# Patient Record
Sex: FEMALE | Race: WHITE | NOT HISPANIC OR LATINO
[De-identification: names, ages, dates, MRNs, and addresses within clinical notes are randomized per-mention and may not be internally consistent; named-entity substitution may affect disease eponyms.]

---

## 2017-01-19 ENCOUNTER — APPOINTMENT (OUTPATIENT)
Dept: ENDOCRINOLOGY | Facility: CLINIC | Age: 82
End: 2017-01-19

## 2017-01-19 VITALS
HEART RATE: 59 BPM | SYSTOLIC BLOOD PRESSURE: 120 MMHG | HEIGHT: 62 IN | WEIGHT: 166 LBS | BODY MASS INDEX: 30.55 KG/M2 | DIASTOLIC BLOOD PRESSURE: 80 MMHG | RESPIRATION RATE: 98 BRPM

## 2017-01-19 DIAGNOSIS — Z63.5 DISRUPTION OF FAMILY BY SEPARATION AND DIVORCE: ICD-10-CM

## 2017-01-19 DIAGNOSIS — Z78.9 OTHER SPECIFIED HEALTH STATUS: ICD-10-CM

## 2017-01-19 SDOH — SOCIAL STABILITY - SOCIAL INSECURITY: DISRUPTION OF FAMILY BY SEPARATION AND DIVORCE: Z63.5

## 2017-01-20 ENCOUNTER — MEDICATION RENEWAL (OUTPATIENT)
Age: 82
End: 2017-01-20

## 2017-01-20 LAB
T4 FREE SERPL-MCNC: 1 NG/DL
TSH SERPL-ACNC: 17.06 UIU/ML

## 2017-01-23 LAB
THYROGLOB AB SERPL-ACNC: 849 IU/ML
THYROPEROXIDASE AB SERPL IA-ACNC: 953 IU/ML

## 2017-03-02 ENCOUNTER — APPOINTMENT (OUTPATIENT)
Dept: ENDOCRINOLOGY | Facility: CLINIC | Age: 82
End: 2017-03-02

## 2017-03-02 VITALS — BODY MASS INDEX: 30 KG/M2 | WEIGHT: 164 LBS

## 2017-03-20 ENCOUNTER — MEDICATION RENEWAL (OUTPATIENT)
Age: 82
End: 2017-03-20

## 2017-03-20 LAB
T4 FREE SERPL-MCNC: 2 NG/DL
TSH SERPL-ACNC: 0.1 UIU/ML

## 2017-04-17 ENCOUNTER — CLINICAL ADVICE (OUTPATIENT)
Age: 82
End: 2017-04-17

## 2017-05-08 ENCOUNTER — RESULT REVIEW (OUTPATIENT)
Age: 82
End: 2017-05-08

## 2017-05-12 ENCOUNTER — APPOINTMENT (OUTPATIENT)
Dept: ENDOCRINOLOGY | Facility: CLINIC | Age: 82
End: 2017-05-12

## 2017-05-12 VITALS
HEIGHT: 62 IN | OXYGEN SATURATION: 98 % | WEIGHT: 164 LBS | SYSTOLIC BLOOD PRESSURE: 120 MMHG | BODY MASS INDEX: 30.18 KG/M2 | HEART RATE: 63 BPM | DIASTOLIC BLOOD PRESSURE: 64 MMHG

## 2017-05-12 RX ORDER — AMMONIUM LACTATE 12 %
12 CREAM (GRAM) TOPICAL
Qty: 385 | Refills: 0 | Status: DISCONTINUED | COMMUNITY
Start: 2016-05-12 | End: 2017-05-12

## 2017-05-16 ENCOUNTER — MEDICATION RENEWAL (OUTPATIENT)
Age: 82
End: 2017-05-16

## 2017-05-16 LAB
25(OH)D3 SERPL-MCNC: 43.4 NG/ML
HBA1C MFR BLD HPLC: 5.4 %
T4 FREE SERPL-MCNC: 1.9 NG/DL
TSH SERPL-ACNC: 0.12 UIU/ML

## 2017-06-14 ENCOUNTER — RX RENEWAL (OUTPATIENT)
Age: 82
End: 2017-06-14

## 2017-06-20 ENCOUNTER — APPOINTMENT (OUTPATIENT)
Dept: ENDOCRINOLOGY | Facility: CLINIC | Age: 82
End: 2017-06-20

## 2017-06-20 VITALS — BODY MASS INDEX: 29.41 KG/M2 | WEIGHT: 160.8 LBS

## 2017-07-06 ENCOUNTER — OUTPATIENT (OUTPATIENT)
Dept: OUTPATIENT SERVICES | Facility: HOSPITAL | Age: 82
LOS: 1 days | End: 2017-07-06
Payer: MEDICARE

## 2017-07-06 DIAGNOSIS — Z01.818 ENCOUNTER FOR OTHER PREPROCEDURAL EXAMINATION: ICD-10-CM

## 2017-07-06 DIAGNOSIS — N83.209 UNSPECIFIED OVARIAN CYST, UNSPECIFIED SIDE: ICD-10-CM

## 2017-07-06 LAB
ANION GAP SERPL CALC-SCNC: 13 MMOL/L — SIGNIFICANT CHANGE UP (ref 5–17)
BUN SERPL-MCNC: 23 MG/DL — SIGNIFICANT CHANGE UP (ref 7–23)
CALCIUM SERPL-MCNC: 9.5 MG/DL — SIGNIFICANT CHANGE UP (ref 8.4–10.5)
CHLORIDE SERPL-SCNC: 104 MMOL/L — SIGNIFICANT CHANGE UP (ref 96–108)
CO2 SERPL-SCNC: 26 MMOL/L — SIGNIFICANT CHANGE UP (ref 22–31)
CREAT SERPL-MCNC: 0.68 MG/DL — SIGNIFICANT CHANGE UP (ref 0.5–1.3)
GLUCOSE SERPL-MCNC: 126 MG/DL — HIGH (ref 70–99)
HCT VFR BLD CALC: 41.9 % — SIGNIFICANT CHANGE UP (ref 34.5–45)
HGB BLD-MCNC: 14 G/DL — SIGNIFICANT CHANGE UP (ref 11.5–15.5)
MCHC RBC-ENTMCNC: 31.5 PG — SIGNIFICANT CHANGE UP (ref 27–34)
MCHC RBC-ENTMCNC: 33.3 GM/DL — SIGNIFICANT CHANGE UP (ref 32–36)
MCV RBC AUTO: 94.5 FL — SIGNIFICANT CHANGE UP (ref 80–100)
PLATELET # BLD AUTO: 256 K/UL — SIGNIFICANT CHANGE UP (ref 150–400)
POTASSIUM SERPL-MCNC: 4.1 MMOL/L — SIGNIFICANT CHANGE UP (ref 3.5–5.3)
POTASSIUM SERPL-SCNC: 4.1 MMOL/L — SIGNIFICANT CHANGE UP (ref 3.5–5.3)
RBC # BLD: 4.43 M/UL — SIGNIFICANT CHANGE UP (ref 3.8–5.2)
RBC # FLD: 12.5 % — SIGNIFICANT CHANGE UP (ref 10.3–14.5)
SODIUM SERPL-SCNC: 143 MMOL/L — SIGNIFICANT CHANGE UP (ref 135–145)
WBC # BLD: 5.7 K/UL — SIGNIFICANT CHANGE UP (ref 3.8–10.5)
WBC # FLD AUTO: 5.7 K/UL — SIGNIFICANT CHANGE UP (ref 3.8–10.5)

## 2017-07-06 PROCEDURE — 85027 COMPLETE CBC AUTOMATED: CPT

## 2017-07-06 PROCEDURE — G0463: CPT

## 2017-07-06 PROCEDURE — 80048 BASIC METABOLIC PNL TOTAL CA: CPT

## 2017-07-12 LAB
T4 FREE SERPL-MCNC: 1.6 NG/DL
TSH SERPL-ACNC: 0.76 UIU/ML

## 2017-07-18 RX ORDER — CELECOXIB 200 MG/1
200 CAPSULE ORAL ONCE
Qty: 0 | Refills: 0 | Status: COMPLETED | OUTPATIENT
Start: 2017-07-20 | End: 2017-07-20

## 2017-07-18 RX ORDER — CEFAZOLIN SODIUM 1 G
2000 VIAL (EA) INJECTION ONCE
Qty: 0 | Refills: 0 | Status: DISCONTINUED | OUTPATIENT
Start: 2017-07-20 | End: 2017-08-04

## 2017-07-18 RX ORDER — ACETAMINOPHEN 500 MG
975 TABLET ORAL ONCE
Qty: 0 | Refills: 0 | Status: COMPLETED | OUTPATIENT
Start: 2017-07-20 | End: 2017-07-20

## 2017-07-18 RX ORDER — TRAMADOL HYDROCHLORIDE 50 MG/1
50 TABLET ORAL ONCE
Qty: 0 | Refills: 0 | Status: DISCONTINUED | OUTPATIENT
Start: 2017-07-20 | End: 2017-07-20

## 2017-07-20 ENCOUNTER — TRANSCRIPTION ENCOUNTER (OUTPATIENT)
Age: 82
End: 2017-07-20

## 2017-07-20 ENCOUNTER — RESULT REVIEW (OUTPATIENT)
Age: 82
End: 2017-07-20

## 2017-07-20 ENCOUNTER — OUTPATIENT (OUTPATIENT)
Dept: OUTPATIENT SERVICES | Facility: HOSPITAL | Age: 82
LOS: 1 days | End: 2017-07-20
Payer: MEDICARE

## 2017-07-20 VITALS
HEART RATE: 37 BPM | OXYGEN SATURATION: 96 % | HEIGHT: 62 IN | DIASTOLIC BLOOD PRESSURE: 70 MMHG | SYSTOLIC BLOOD PRESSURE: 112 MMHG | TEMPERATURE: 99 F | RESPIRATION RATE: 18 BRPM

## 2017-07-20 VITALS
HEART RATE: 62 BPM | DIASTOLIC BLOOD PRESSURE: 67 MMHG | RESPIRATION RATE: 14 BRPM | OXYGEN SATURATION: 97 % | SYSTOLIC BLOOD PRESSURE: 125 MMHG

## 2017-07-20 DIAGNOSIS — N83.209 UNSPECIFIED OVARIAN CYST, UNSPECIFIED SIDE: ICD-10-CM

## 2017-07-20 LAB
BLD GP AB SCN SERPL QL: NEGATIVE — SIGNIFICANT CHANGE UP
RH IG SCN BLD-IMP: NEGATIVE — SIGNIFICANT CHANGE UP

## 2017-07-20 PROCEDURE — 88341 IMHCHEM/IMCYTCHM EA ADD ANTB: CPT

## 2017-07-20 PROCEDURE — 88331 PATH CONSLTJ SURG 1 BLK 1SPC: CPT | Mod: 26

## 2017-07-20 PROCEDURE — 88331 PATH CONSLTJ SURG 1 BLK 1SPC: CPT

## 2017-07-20 PROCEDURE — 86850 RBC ANTIBODY SCREEN: CPT

## 2017-07-20 PROCEDURE — 88307 TISSUE EXAM BY PATHOLOGIST: CPT | Mod: 26

## 2017-07-20 PROCEDURE — 88307 TISSUE EXAM BY PATHOLOGIST: CPT

## 2017-07-20 PROCEDURE — 88305 TISSUE EXAM BY PATHOLOGIST: CPT

## 2017-07-20 PROCEDURE — 88112 CYTOPATH CELL ENHANCE TECH: CPT

## 2017-07-20 PROCEDURE — 88342 IMHCHEM/IMCYTCHM 1ST ANTB: CPT

## 2017-07-20 PROCEDURE — 58558 HYSTEROSCOPY BIOPSY: CPT

## 2017-07-20 PROCEDURE — 88342 IMHCHEM/IMCYTCHM 1ST ANTB: CPT | Mod: 26

## 2017-07-20 PROCEDURE — 86900 BLOOD TYPING SEROLOGIC ABO: CPT

## 2017-07-20 PROCEDURE — 88304 TISSUE EXAM BY PATHOLOGIST: CPT

## 2017-07-20 PROCEDURE — 86901 BLOOD TYPING SEROLOGIC RH(D): CPT

## 2017-07-20 PROCEDURE — 88341 IMHCHEM/IMCYTCHM EA ADD ANTB: CPT | Mod: 26

## 2017-07-20 PROCEDURE — 58661 LAPAROSCOPY REMOVE ADNEXA: CPT

## 2017-07-20 PROCEDURE — 88304 TISSUE EXAM BY PATHOLOGIST: CPT | Mod: 26

## 2017-07-20 PROCEDURE — 88305 TISSUE EXAM BY PATHOLOGIST: CPT | Mod: 26

## 2017-07-20 PROCEDURE — 88112 CYTOPATH CELL ENHANCE TECH: CPT | Mod: 26

## 2017-07-20 RX ORDER — OXYCODONE HYDROCHLORIDE 5 MG/1
1 TABLET ORAL
Qty: 10 | Refills: 0
Start: 2017-07-20

## 2017-07-20 RX ORDER — SODIUM CHLORIDE 9 MG/ML
3 INJECTION INTRAMUSCULAR; INTRAVENOUS; SUBCUTANEOUS EVERY 8 HOURS
Qty: 0 | Refills: 0 | Status: DISCONTINUED | OUTPATIENT
Start: 2017-07-20 | End: 2017-07-20

## 2017-07-20 RX ORDER — HEPARIN SODIUM 5000 [USP'U]/ML
5000 INJECTION INTRAVENOUS; SUBCUTANEOUS ONCE
Qty: 0 | Refills: 0 | Status: COMPLETED | OUTPATIENT
Start: 2017-07-20 | End: 2017-07-20

## 2017-07-20 RX ORDER — HYDROMORPHONE HYDROCHLORIDE 2 MG/ML
0.5 INJECTION INTRAMUSCULAR; INTRAVENOUS; SUBCUTANEOUS
Qty: 0 | Refills: 0 | Status: DISCONTINUED | OUTPATIENT
Start: 2017-07-20 | End: 2017-07-20

## 2017-07-20 RX ORDER — ONDANSETRON 8 MG/1
4 TABLET, FILM COATED ORAL ONCE
Qty: 0 | Refills: 0 | Status: DISCONTINUED | OUTPATIENT
Start: 2017-07-20 | End: 2017-07-20

## 2017-07-20 RX ORDER — SODIUM CHLORIDE 9 MG/ML
1000 INJECTION, SOLUTION INTRAVENOUS
Qty: 0 | Refills: 0 | Status: DISCONTINUED | OUTPATIENT
Start: 2017-07-20 | End: 2017-08-04

## 2017-07-20 RX ADMIN — SODIUM CHLORIDE 125 MILLILITER(S): 9 INJECTION, SOLUTION INTRAVENOUS at 16:00

## 2017-07-20 RX ADMIN — CELECOXIB 200 MILLIGRAM(S): 200 CAPSULE ORAL at 11:16

## 2017-07-20 RX ADMIN — HEPARIN SODIUM 5000 UNIT(S): 5000 INJECTION INTRAVENOUS; SUBCUTANEOUS at 11:20

## 2017-07-20 RX ADMIN — SODIUM CHLORIDE 3 MILLILITER(S): 9 INJECTION INTRAMUSCULAR; INTRAVENOUS; SUBCUTANEOUS at 11:18

## 2017-07-20 RX ADMIN — Medication 975 MILLIGRAM(S): at 11:16

## 2017-07-20 RX ADMIN — TRAMADOL HYDROCHLORIDE 50 MILLIGRAM(S): 50 TABLET ORAL at 12:37

## 2017-07-20 NOTE — BRIEF OPERATIVE NOTE - OPERATION/FINDINGS
On hysteroscopy endometrium atrophic with subcentimeter R walled uterine polyp. Grossly normal upper abdomen, minimal fine adhesion omentum to ant abdominal wall. Small mobile uterus. Grossly normal left tube and ovary. Right ovary with 3-4cm simple ovarian cyst

## 2017-07-20 NOTE — BRIEF OPERATIVE NOTE - PRE-OP DX
Adnexal mass  07/20/2017  Right  Active  Sample, Kerrie  Endometrial polyp  07/20/2017    Active  Sample, Kerrie

## 2017-07-20 NOTE — BRIEF OPERATIVE NOTE - POST-OP DX
Cyst of right ovary  07/20/2017    Active  Sample, Kerrie  Endometrial polyp  07/20/2017    Active  Sample, Kerrie

## 2017-07-20 NOTE — ASU DISCHARGE PLAN (ADULT/PEDIATRIC). - MEDICATION SUMMARY - MEDICATIONS TO TAKE
I will START or STAY ON the medications listed below when I get home from the hospital:    Tylenol 325 mg oral tablet  -- 3 tab(s) by mouth every 6 hours  -- Indication: For pain    Motrin 600 mg oral tablet  -- 1 tab(s) by mouth every 6 hours  -- Indication: For pain    oxyCODONE 5 mg oral tablet  -- 1 tab(s) by mouth every 6 hours, As Needed MDD:4  -- Caution federal law prohibits the transfer of this drug to any person other  than the person for whom it was prescribed.  It is very important that you take or use this exactly as directed.  Do not skip doses or discontinue unless directed by your doctor.  May cause drowsiness.  Alcohol may intensify this effect.  Use care when operating dangerous machinery.  This prescription cannot be refilled.  Using more of this medication than prescribed may cause serious breathing problems.    -- Indication: For pain

## 2017-07-20 NOTE — ASU DISCHARGE PLAN (ADULT/PEDIATRIC). - ACTIVITY LEVEL
no douching/for two weeks/no intercourse/no heavy lifting/no tampons/no tub baths/nothing per vagina

## 2017-07-20 NOTE — BRIEF OPERATIVE NOTE - SPECIMENS
EMC, endometrial polyp, Left fallopian tube, Right fallopian tube and ovary, Cyst Fluid, peritoneal wshings

## 2017-07-20 NOTE — BRIEF OPERATIVE NOTE - PROCEDURE
Laparoscopic salpingo-oophorectomy  07/20/2017  Right  Active  NSAMPLE  Laparoscopic salpingectomy  07/20/2017  Left  Active  NSAMPLE  Hysteroscopy with polypectomy of uterus  07/20/2017    Active  NSAMPLE

## 2017-07-24 LAB
NON-GYNECOLOGICAL CYTOLOGY STUDY: SIGNIFICANT CHANGE UP
NON-GYNECOLOGICAL CYTOLOGY STUDY: SIGNIFICANT CHANGE UP

## 2017-07-25 LAB — SURGICAL PATHOLOGY STUDY: SIGNIFICANT CHANGE UP

## 2017-10-13 ENCOUNTER — APPOINTMENT (OUTPATIENT)
Dept: ENDOCRINOLOGY | Facility: CLINIC | Age: 82
End: 2017-10-13
Payer: MEDICARE

## 2017-10-13 VITALS — BODY MASS INDEX: 28.94 KG/M2 | WEIGHT: 158.2 LBS

## 2017-10-13 PROCEDURE — 97803 MED NUTRITION INDIV SUBSEQ: CPT

## 2017-11-14 ENCOUNTER — APPOINTMENT (OUTPATIENT)
Dept: ENDOCRINOLOGY | Facility: CLINIC | Age: 82
End: 2017-11-14
Payer: MEDICARE

## 2017-11-14 VITALS
HEART RATE: 67 BPM | OXYGEN SATURATION: 97 % | DIASTOLIC BLOOD PRESSURE: 68 MMHG | HEIGHT: 62 IN | SYSTOLIC BLOOD PRESSURE: 122 MMHG | WEIGHT: 157 LBS | BODY MASS INDEX: 28.89 KG/M2

## 2017-11-14 DIAGNOSIS — R73.09 OTHER ABNORMAL GLUCOSE: ICD-10-CM

## 2017-11-14 PROCEDURE — 99214 OFFICE O/P EST MOD 30 MIN: CPT

## 2017-11-14 RX ORDER — LEVOTHYROXINE SODIUM 112 UG/1
112 TABLET ORAL
Qty: 90 | Refills: 2 | Status: DISCONTINUED | COMMUNITY
Start: 2017-06-14 | End: 2017-11-14

## 2017-11-17 ENCOUNTER — MEDICATION RENEWAL (OUTPATIENT)
Age: 82
End: 2017-11-17

## 2017-11-17 LAB
ALBUMIN SERPL ELPH-MCNC: 4.2 G/DL
ALP BLD-CCNC: 84 U/L
ALT SERPL-CCNC: 15 U/L
ANION GAP SERPL CALC-SCNC: 16 MMOL/L
AST SERPL-CCNC: 20 U/L
BILIRUB SERPL-MCNC: 0.4 MG/DL
BUN SERPL-MCNC: 19 MG/DL
CALCIUM SERPL-MCNC: 10.1 MG/DL
CHLORIDE SERPL-SCNC: 104 MMOL/L
CO2 SERPL-SCNC: 25 MMOL/L
CREAT SERPL-MCNC: 0.81 MG/DL
GLUCOSE SERPL-MCNC: 89 MG/DL
HBA1C MFR BLD HPLC: 5.5 %
POTASSIUM SERPL-SCNC: 4.3 MMOL/L
PROT SERPL-MCNC: 6.8 G/DL
SODIUM SERPL-SCNC: 145 MMOL/L
T4 FREE SERPL-MCNC: 1.7 NG/DL
TSH SERPL-ACNC: 1.59 UIU/ML

## 2018-03-15 ENCOUNTER — APPOINTMENT (OUTPATIENT)
Dept: ORTHOPEDIC SURGERY | Facility: CLINIC | Age: 83
End: 2018-03-15

## 2018-03-16 ENCOUNTER — APPOINTMENT (OUTPATIENT)
Dept: ORTHOPEDIC SURGERY | Facility: CLINIC | Age: 83
End: 2018-03-16
Payer: MEDICARE

## 2018-03-16 VITALS
DIASTOLIC BLOOD PRESSURE: 68 MMHG | WEIGHT: 160 LBS | SYSTOLIC BLOOD PRESSURE: 123 MMHG | HEART RATE: 62 BPM | BODY MASS INDEX: 29.44 KG/M2 | HEIGHT: 62 IN

## 2018-03-16 DIAGNOSIS — M25.512 PAIN IN LEFT SHOULDER: ICD-10-CM

## 2018-03-16 PROCEDURE — 99204 OFFICE O/P NEW MOD 45 MIN: CPT

## 2018-03-16 PROCEDURE — 72040 X-RAY EXAM NECK SPINE 2-3 VW: CPT

## 2018-03-16 PROCEDURE — 73030 X-RAY EXAM OF SHOULDER: CPT | Mod: LT

## 2018-03-16 RX ORDER — LOTEPREDNOL ETABONATE 5 MG/ML
0.5 SUSPENSION/ DROPS OPHTHALMIC
Qty: 5 | Refills: 0 | Status: ACTIVE | COMMUNITY
Start: 2017-08-11

## 2018-03-16 RX ORDER — BRINZOLAMIDE 10 MG/ML
1 SUSPENSION/ DROPS OPHTHALMIC
Qty: 10 | Refills: 0 | Status: ACTIVE | COMMUNITY
Start: 2017-10-31

## 2018-03-16 RX ORDER — CYCLOSPORINE 0.5 MG/ML
0.05 EMULSION OPHTHALMIC
Qty: 180 | Refills: 0 | Status: ACTIVE | COMMUNITY
Start: 2017-08-11

## 2018-03-16 RX ORDER — LEVOTHYROXINE SODIUM 137 UG/1
TABLET ORAL
Refills: 0 | Status: ACTIVE | COMMUNITY

## 2018-04-26 ENCOUNTER — APPOINTMENT (OUTPATIENT)
Dept: ORTHOPEDIC SURGERY | Facility: CLINIC | Age: 83
End: 2018-04-26
Payer: MEDICARE

## 2018-04-26 VITALS
HEART RATE: 48 BPM | BODY MASS INDEX: 29.44 KG/M2 | DIASTOLIC BLOOD PRESSURE: 66 MMHG | SYSTOLIC BLOOD PRESSURE: 146 MMHG | WEIGHT: 160 LBS | HEIGHT: 62 IN

## 2018-04-26 PROCEDURE — 99213 OFFICE O/P EST LOW 20 MIN: CPT

## 2018-05-15 ENCOUNTER — APPOINTMENT (OUTPATIENT)
Dept: ENDOCRINOLOGY | Facility: CLINIC | Age: 83
End: 2018-05-15

## 2019-02-27 ENCOUNTER — RX RENEWAL (OUTPATIENT)
Age: 84
End: 2019-02-27

## 2019-04-30 ENCOUNTER — APPOINTMENT (OUTPATIENT)
Dept: ENDOCRINOLOGY | Facility: CLINIC | Age: 84
End: 2019-04-30
Payer: MEDICARE

## 2019-04-30 VITALS
HEIGHT: 62 IN | TEMPERATURE: 97.4 F | WEIGHT: 164 LBS | DIASTOLIC BLOOD PRESSURE: 74 MMHG | SYSTOLIC BLOOD PRESSURE: 154 MMHG | OXYGEN SATURATION: 95 % | BODY MASS INDEX: 30.18 KG/M2 | HEART RATE: 57 BPM | RESPIRATION RATE: 12 BRPM

## 2019-04-30 DIAGNOSIS — E03.9 HYPOTHYROIDISM, UNSPECIFIED: ICD-10-CM

## 2019-04-30 PROCEDURE — 99213 OFFICE O/P EST LOW 20 MIN: CPT

## 2019-05-01 PROBLEM — E03.9 HYPOTHYROIDISM: Status: ACTIVE | Noted: 2017-01-19

## 2019-05-02 LAB
T4 FREE SERPL-MCNC: 1.4 NG/DL
TSH SERPL-ACNC: 4.04 UIU/ML

## 2019-07-11 ENCOUNTER — OUTPATIENT (OUTPATIENT)
Dept: OUTPATIENT SERVICES | Facility: HOSPITAL | Age: 84
LOS: 1 days | End: 2019-07-11
Payer: MEDICARE

## 2019-07-11 DIAGNOSIS — D50.9 IRON DEFICIENCY ANEMIA, UNSPECIFIED: ICD-10-CM

## 2019-07-11 PROCEDURE — 44366 SMALL BOWEL ENDOSCOPY: CPT

## 2019-07-11 PROCEDURE — G0105: CPT

## 2019-07-11 PROCEDURE — 44369 SMALL BOWEL ENDOSCOPY: CPT | Mod: XS

## 2019-11-18 ENCOUNTER — RX RENEWAL (OUTPATIENT)
Age: 84
End: 2019-11-18

## 2019-11-27 ENCOUNTER — EMERGENCY (EMERGENCY)
Facility: HOSPITAL | Age: 84
LOS: 1 days | Discharge: HOME | End: 2019-11-27
Attending: EMERGENCY MEDICINE
Payer: MEDICARE

## 2019-11-27 VITALS
TEMPERATURE: 98 F | SYSTOLIC BLOOD PRESSURE: 155 MMHG | DIASTOLIC BLOOD PRESSURE: 67 MMHG | HEART RATE: 51 BPM | RESPIRATION RATE: 18 BRPM | WEIGHT: 160.06 LBS | HEIGHT: 62 IN | OXYGEN SATURATION: 97 %

## 2019-11-27 VITALS
HEART RATE: 52 BPM | DIASTOLIC BLOOD PRESSURE: 68 MMHG | RESPIRATION RATE: 18 BRPM | OXYGEN SATURATION: 100 % | SYSTOLIC BLOOD PRESSURE: 148 MMHG | TEMPERATURE: 98 F

## 2019-11-27 LAB
ALBUMIN SERPL ELPH-MCNC: 4.2 G/DL — SIGNIFICANT CHANGE UP (ref 3.3–5)
ALP SERPL-CCNC: 78 U/L — SIGNIFICANT CHANGE UP (ref 40–120)
ALT FLD-CCNC: 21 U/L — SIGNIFICANT CHANGE UP (ref 10–45)
ANION GAP SERPL CALC-SCNC: 13 MMOL/L — SIGNIFICANT CHANGE UP (ref 5–17)
APPEARANCE UR: CLEAR — SIGNIFICANT CHANGE UP
AST SERPL-CCNC: 23 U/L — SIGNIFICANT CHANGE UP (ref 10–40)
BACTERIA # UR AUTO: NEGATIVE — SIGNIFICANT CHANGE UP
BASE EXCESS BLDV CALC-SCNC: 3.4 MMOL/L — HIGH (ref -2–2)
BASOPHILS # BLD AUTO: 0.03 K/UL — SIGNIFICANT CHANGE UP (ref 0–0.2)
BASOPHILS NFR BLD AUTO: 0.6 % — SIGNIFICANT CHANGE UP (ref 0–2)
BILIRUB SERPL-MCNC: 0.4 MG/DL — SIGNIFICANT CHANGE UP (ref 0.2–1.2)
BILIRUB UR-MCNC: NEGATIVE — SIGNIFICANT CHANGE UP
BUN SERPL-MCNC: 16 MG/DL — SIGNIFICANT CHANGE UP (ref 7–23)
CA-I SERPL-SCNC: 1.21 MMOL/L — SIGNIFICANT CHANGE UP (ref 1.12–1.3)
CALCIUM SERPL-MCNC: 9.8 MG/DL — SIGNIFICANT CHANGE UP (ref 8.4–10.5)
CHLORIDE BLDV-SCNC: 111 MMOL/L — HIGH (ref 96–108)
CHLORIDE SERPL-SCNC: 106 MMOL/L — SIGNIFICANT CHANGE UP (ref 96–108)
CO2 BLDV-SCNC: 30 MMOL/L — SIGNIFICANT CHANGE UP (ref 22–30)
CO2 SERPL-SCNC: 25 MMOL/L — SIGNIFICANT CHANGE UP (ref 22–31)
COLOR SPEC: SIGNIFICANT CHANGE UP
CREAT SERPL-MCNC: 0.66 MG/DL — SIGNIFICANT CHANGE UP (ref 0.5–1.3)
DIFF PNL FLD: NEGATIVE — SIGNIFICANT CHANGE UP
EOSINOPHIL # BLD AUTO: 0.15 K/UL — SIGNIFICANT CHANGE UP (ref 0–0.5)
EOSINOPHIL NFR BLD AUTO: 2.9 % — SIGNIFICANT CHANGE UP (ref 0–6)
EPI CELLS # UR: 1 /HPF — SIGNIFICANT CHANGE UP
GAS PNL BLDV: 139 MMOL/L — SIGNIFICANT CHANGE UP (ref 135–145)
GAS PNL BLDV: SIGNIFICANT CHANGE UP
GLUCOSE BLDV-MCNC: 84 MG/DL — SIGNIFICANT CHANGE UP (ref 70–99)
GLUCOSE SERPL-MCNC: 89 MG/DL — SIGNIFICANT CHANGE UP (ref 70–99)
GLUCOSE UR QL: NEGATIVE — SIGNIFICANT CHANGE UP
HCO3 BLDV-SCNC: 29 MMOL/L — SIGNIFICANT CHANGE UP (ref 21–29)
HCT VFR BLD CALC: 38.1 % — SIGNIFICANT CHANGE UP (ref 34.5–45)
HCT VFR BLDA CALC: 40 % — SIGNIFICANT CHANGE UP (ref 39–50)
HGB BLD CALC-MCNC: 13 G/DL — SIGNIFICANT CHANGE UP (ref 11.5–15.5)
HGB BLD-MCNC: 12.5 G/DL — SIGNIFICANT CHANGE UP (ref 11.5–15.5)
HYALINE CASTS # UR AUTO: 0 /LPF — SIGNIFICANT CHANGE UP (ref 0–2)
IMM GRANULOCYTES NFR BLD AUTO: 0.2 % — SIGNIFICANT CHANGE UP (ref 0–1.5)
KETONES UR-MCNC: NEGATIVE — SIGNIFICANT CHANGE UP
LACTATE BLDV-MCNC: 0.9 MMOL/L — SIGNIFICANT CHANGE UP (ref 0.7–2)
LEUKOCYTE ESTERASE UR-ACNC: ABNORMAL
LYMPHOCYTES # BLD AUTO: 1.05 K/UL — SIGNIFICANT CHANGE UP (ref 1–3.3)
LYMPHOCYTES # BLD AUTO: 20.4 % — SIGNIFICANT CHANGE UP (ref 13–44)
MCHC RBC-ENTMCNC: 30.3 PG — SIGNIFICANT CHANGE UP (ref 27–34)
MCHC RBC-ENTMCNC: 32.8 GM/DL — SIGNIFICANT CHANGE UP (ref 32–36)
MCV RBC AUTO: 92.5 FL — SIGNIFICANT CHANGE UP (ref 80–100)
MONOCYTES # BLD AUTO: 0.45 K/UL — SIGNIFICANT CHANGE UP (ref 0–0.9)
MONOCYTES NFR BLD AUTO: 8.7 % — SIGNIFICANT CHANGE UP (ref 2–14)
NEUTROPHILS # BLD AUTO: 3.46 K/UL — SIGNIFICANT CHANGE UP (ref 1.8–7.4)
NEUTROPHILS NFR BLD AUTO: 67.2 % — SIGNIFICANT CHANGE UP (ref 43–77)
NITRITE UR-MCNC: NEGATIVE — SIGNIFICANT CHANGE UP
NRBC # BLD: 0 /100 WBCS — SIGNIFICANT CHANGE UP (ref 0–0)
PCO2 BLDV: 50 MMHG — SIGNIFICANT CHANGE UP (ref 35–50)
PH BLDV: 7.38 — SIGNIFICANT CHANGE UP (ref 7.35–7.45)
PH UR: 6 — SIGNIFICANT CHANGE UP (ref 5–8)
PLATELET # BLD AUTO: 257 K/UL — SIGNIFICANT CHANGE UP (ref 150–400)
PO2 BLDV: 23 MMHG — LOW (ref 25–45)
POTASSIUM BLDV-SCNC: 3.6 MMOL/L — SIGNIFICANT CHANGE UP (ref 3.5–5.3)
POTASSIUM SERPL-MCNC: 4 MMOL/L — SIGNIFICANT CHANGE UP (ref 3.5–5.3)
POTASSIUM SERPL-SCNC: 4 MMOL/L — SIGNIFICANT CHANGE UP (ref 3.5–5.3)
PROT SERPL-MCNC: 6.9 G/DL — SIGNIFICANT CHANGE UP (ref 6–8.3)
PROT UR-MCNC: NEGATIVE — SIGNIFICANT CHANGE UP
RBC # BLD: 4.12 M/UL — SIGNIFICANT CHANGE UP (ref 3.8–5.2)
RBC # FLD: 14.5 % — SIGNIFICANT CHANGE UP (ref 10.3–14.5)
RBC CASTS # UR COMP ASSIST: 1 /HPF — SIGNIFICANT CHANGE UP (ref 0–4)
SAO2 % BLDV: 36 % — LOW (ref 67–88)
SODIUM SERPL-SCNC: 144 MMOL/L — SIGNIFICANT CHANGE UP (ref 135–145)
SP GR SPEC: 1.01 — LOW (ref 1.01–1.02)
TROPONIN T, HIGH SENSITIVITY RESULT: 7 NG/L — SIGNIFICANT CHANGE UP (ref 0–51)
TSH SERPL-MCNC: 13.2 UIU/ML — HIGH (ref 0.27–4.2)
UROBILINOGEN FLD QL: NEGATIVE — SIGNIFICANT CHANGE UP
WBC # BLD: 5.15 K/UL — SIGNIFICANT CHANGE UP (ref 3.8–10.5)
WBC # FLD AUTO: 5.15 K/UL — SIGNIFICANT CHANGE UP (ref 3.8–10.5)
WBC UR QL: 3 /HPF — SIGNIFICANT CHANGE UP (ref 0–5)

## 2019-11-27 PROCEDURE — 87086 URINE CULTURE/COLONY COUNT: CPT

## 2019-11-27 PROCEDURE — 83605 ASSAY OF LACTIC ACID: CPT

## 2019-11-27 PROCEDURE — 71046 X-RAY EXAM CHEST 2 VIEWS: CPT | Mod: 26

## 2019-11-27 PROCEDURE — 84132 ASSAY OF SERUM POTASSIUM: CPT

## 2019-11-27 PROCEDURE — 84443 ASSAY THYROID STIM HORMONE: CPT

## 2019-11-27 PROCEDURE — 93005 ELECTROCARDIOGRAM TRACING: CPT

## 2019-11-27 PROCEDURE — 99284 EMERGENCY DEPT VISIT MOD MDM: CPT

## 2019-11-27 PROCEDURE — 85027 COMPLETE CBC AUTOMATED: CPT

## 2019-11-27 PROCEDURE — 82435 ASSAY OF BLOOD CHLORIDE: CPT

## 2019-11-27 PROCEDURE — 96360 HYDRATION IV INFUSION INIT: CPT

## 2019-11-27 PROCEDURE — 82803 BLOOD GASES ANY COMBINATION: CPT

## 2019-11-27 PROCEDURE — 84295 ASSAY OF SERUM SODIUM: CPT

## 2019-11-27 PROCEDURE — 81001 URINALYSIS AUTO W/SCOPE: CPT

## 2019-11-27 PROCEDURE — 80053 COMPREHEN METABOLIC PANEL: CPT

## 2019-11-27 PROCEDURE — 84484 ASSAY OF TROPONIN QUANT: CPT

## 2019-11-27 PROCEDURE — 71046 X-RAY EXAM CHEST 2 VIEWS: CPT

## 2019-11-27 PROCEDURE — 99284 EMERGENCY DEPT VISIT MOD MDM: CPT | Mod: 25

## 2019-11-27 PROCEDURE — 83735 ASSAY OF MAGNESIUM: CPT

## 2019-11-27 PROCEDURE — 85014 HEMATOCRIT: CPT

## 2019-11-27 PROCEDURE — 84100 ASSAY OF PHOSPHORUS: CPT

## 2019-11-27 PROCEDURE — 82947 ASSAY GLUCOSE BLOOD QUANT: CPT

## 2019-11-27 PROCEDURE — 82330 ASSAY OF CALCIUM: CPT

## 2019-11-27 RX ORDER — SODIUM CHLORIDE 9 MG/ML
1000 INJECTION INTRAMUSCULAR; INTRAVENOUS; SUBCUTANEOUS ONCE
Refills: 0 | Status: COMPLETED | OUTPATIENT
Start: 2019-11-27 | End: 2019-11-27

## 2019-11-27 RX ADMIN — SODIUM CHLORIDE 1000 MILLILITER(S): 9 INJECTION INTRAMUSCULAR; INTRAVENOUS; SUBCUTANEOUS at 16:00

## 2019-11-27 RX ADMIN — SODIUM CHLORIDE 1000 MILLILITER(S): 9 INJECTION INTRAMUSCULAR; INTRAVENOUS; SUBCUTANEOUS at 15:02

## 2019-11-27 NOTE — ED PROVIDER NOTE - PHYSICAL EXAMINATION
General appearance: NAD, conversant, afebrile    Eyes: anicteric sclerae   Neck: Trachea midline; Full range of motion, supple   Pulm: CTA bl, normal respiratory effort and no intercostal retractions, normal work of breathing   CV: regular, bradycardic, No murmurs, rubs, or gallops   Abdomen: Soft, non-tender, non-distended; no masses or hepatosplenomegaly.   Extremities: No peripheral edema    Neuro: No fnds, 5/5 strength x4, gross sensation intact   Skin: Dry, normal temperature, turgor and texture; no rash   Psych: Appropriate affect, cooperative; alert and oriented to person, place and time

## 2019-11-27 NOTE — ED ADULT TRIAGE NOTE - BSA (M2)
1.74 pt AOX4 c/o passing out today at 0700, pt states he felt dizzy and then does not remember anything until he woke up on the floor, pt denies any headache, dizziness, disorientation after passing out, pt went to PMD and was told to come to ED.

## 2019-11-27 NOTE — ED PROVIDER NOTE - PMH
AVM (arteriovenous malformation)  small intestinal  Hypothyroidism    IBS (Irritable Bowel Syndrome)    Proctitis    Renal stone    Shingles    Tremors of nervous system

## 2019-11-27 NOTE — ED PROVIDER NOTE - NSFOLLOWUPINSTRUCTIONS_ED_ALL_ED_FT
Activities as tolerated. Please encourage good oral and fluid intake. For pain, please take Motrin 400mg every 4 hours as needed, or Tylenol 650mg every 6 hours as needed.    Please see your primary care doctor within 24-48 hours for further management of your symptoms.    Please seek emergent medical management if you have any worsening signs or symptoms, such as chest pain, difficulty breathing, loss of consciousness, or persistent vomiting.

## 2019-11-27 NOTE — ED ADULT NURSE REASSESSMENT NOTE - NS ED NURSE REASSESS COMMENT FT1
Pt notified on pending repeat troponin. Pt refused. States she wants to go home right now. Ifeanyi VALERIO aware. Report given to Erica WARNER

## 2019-11-27 NOTE — ED ADULT NURSE REASSESSMENT NOTE - NS ED NURSE REASSESS COMMENT FT1
Pt returned to room, states she had stepped outside to make phone call.   Awaiting repeat lab results. NAD noted. Pt refuses to allow placement of cardiac monitor. MD Gallagher aware.

## 2019-11-27 NOTE — ED ADULT NURSE REASSESSMENT NOTE - NS ED NURSE REASSESS COMMENT FT1
Report received from Renetta WARNER for break coverage. Bloodwork drawn and sent to lab. Pending results. Pt sinus daiana on cardiac monitor.

## 2019-11-27 NOTE — ED ADULT NURSE NOTE - NSIMPLEMENTINTERV_GEN_ALL_ED
Implemented All Fall with Harm Risk Interventions:  [place] to call system. Call bell, personal items and telephone within reach. Instruct patient to call for assistance. Room bathroom lighting operational. Non-slip footwear when patient is off stretcher. Physically safe environment: no spills, clutter or unnecessary equipment. Stretcher in lowest position, wheels locked, appropriate side rails in place. Provide visual cue, wrist band, yellow gown, etc. Monitor gait and stability. Monitor for mental status changes and reorient to person, place, and time. Review medications for side effects contributing to fall risk. Reinforce activity limits and safety measures with patient and family. Provide visual clues: red socks.

## 2019-11-27 NOTE — ED PROVIDER NOTE - PSH
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

## 2019-11-27 NOTE — ED PROVIDER NOTE - OBJECTIVE STATEMENT
83yo female pmh hypothyroid on synthroid, tremors on propranolol bid presenting with generalized weakness and lightheadedness for the last 3 hours.  States she was sitting when symptoms started and hasn't experienced this before.  No recent medication changes.  Admits to lower abdominal pain that is chronic and intermittent.  No fever, chills, headache, loc, n/v, congestion, cp, sob, cough.  States urine has been darker yellow over the last few days/weeks but denies urinary complaints.  Denies vaginal bleeding or blood in the stool.  Found to have a heart rate of high 40s-50s, no history of this per patient.

## 2019-11-27 NOTE — ED ADULT NURSE REASSESSMENT NOTE - NS ED NURSE REASSESS COMMENT FT1
MD Suggs reports he cali pts repeat bloodwork, she is currently willing to wait for results. Reports pt IV d/c intact w/o complication following blood draw.

## 2019-11-27 NOTE — ED ADULT NURSE REASSESSMENT NOTE - NS ED NURSE REASSESS COMMENT FT1
Report received from ALANA MAJOR & Isaura NEFF. Repeat blood work ordered, pt currently refusing, states "I'm going home". MD Suggs aware, will go speak with pt. Safety maintained at all times, bed in lowest position, call bell in reach. Will continue to monitor closely.

## 2019-11-27 NOTE — ED PROVIDER NOTE - PATIENT PORTAL LINK FT
You can access the FollowMyHealth Patient Portal offered by [facility] by registering at the following website: [email or web address]. By joining [employer]’s FollowMyHealth portal, you will also be able to view your health information using other applications (apps) compatible with our system.

## 2019-11-27 NOTE — ED ADULT NURSE NOTE - OBJECTIVE STATEMENT
84 yr old female to ED coming From [employer] function. Pt has h/o Hypothyroid, IBS, AVM malformation, C/o sitting drinking coffee and suddenly " feeling weak." Pt states, "I became lightheaded and felt weak." Denied room spinning Denies chest pain or SOB Denies fever or chills. N/V Denies change in vision or headache.  No facial droop or slurred speech.  Denies weakness or numbness to extremities. Responds approp to verbal and tactile stimuli. Pt was tos see GI MD this weak for min rectal bleeding . Has HX IBS.

## 2019-11-27 NOTE — ED ADULT NURSE REASSESSMENT NOTE - NS ED NURSE REASSESS COMMENT FT1
Pt noted to not be in room. Paged to return to stretcher w/ no response. IV noted to be removed and laying on stretcher, fully intact. Pt called w/ no response. MD Gallagher aware. Pt noted to not be in room. [facility] MD Gallagher aware.

## 2019-11-27 NOTE — ED PROVIDER NOTE - CLINICAL SUMMARY MEDICAL DECISION MAKING FREE TEXT BOX
85yo female with pmh hypothyroidism presenting with generalized weakness and lightheadedness.  Given pulse and abrupt onset of constant symptoms, concern for symptomatic bradycardia.  EKG sinus bradycardia.  Patient appears otherwise well and is conversant.  No clear source of infection to explain weakness, will get basics electrolytes, and urine. 85yo female with pmh hypothyroidism presenting with generalized weakness and lightheadedness.  Given pulse and abrupt onset of constant symptoms, concern for symptomatic bradycardia.  EKG sinus bradycardia.  Patient appears otherwise well and is conversant.  No clear source of infection to explain weakness, will get basics electrolytes, and urine.  Gaston: 84 year old female with hypothyroid on synthroid here with generalized weakness and lightheaded for past few hours. has suprapubic pain intermittent. will get labs, ua, ivf, check electrolytes.  reassess.

## 2019-11-27 NOTE — ED PROVIDER NOTE - PROGRESS NOTE DETAILS
Jamison Pearl PGY2  delta trop neg, labs neg, patient clinically and hd stable for dc Jamison Pearl PGY2  delta trop neg, labs neg, patient clinically and hd stable for dc. patient feels better after ivf.

## 2019-11-28 LAB
CULTURE RESULTS: SIGNIFICANT CHANGE UP
SPECIMEN SOURCE: SIGNIFICANT CHANGE UP

## 2020-10-09 ENCOUNTER — EMERGENCY (EMERGENCY)
Facility: HOSPITAL | Age: 85
LOS: 1 days | Discharge: HOME | End: 2020-10-09
Attending: EMERGENCY MEDICINE
Payer: MEDICARE

## 2020-10-09 VITALS
RESPIRATION RATE: 18 BRPM | SYSTOLIC BLOOD PRESSURE: 187 MMHG | WEIGHT: 149.91 LBS | TEMPERATURE: 98 F | HEIGHT: 62 IN | HEART RATE: 50 BPM | DIASTOLIC BLOOD PRESSURE: 70 MMHG | OXYGEN SATURATION: 96 %

## 2020-10-09 VITALS
TEMPERATURE: 98 F | DIASTOLIC BLOOD PRESSURE: 68 MMHG | OXYGEN SATURATION: 98 % | RESPIRATION RATE: 14 BRPM | SYSTOLIC BLOOD PRESSURE: 157 MMHG | HEART RATE: 54 BPM

## 2020-10-09 PROCEDURE — 73080 X-RAY EXAM OF ELBOW: CPT | Mod: 26,RT

## 2020-10-09 PROCEDURE — 73100 X-RAY EXAM OF WRIST: CPT

## 2020-10-09 PROCEDURE — 73110 X-RAY EXAM OF WRIST: CPT | Mod: 26,RT,76

## 2020-10-09 PROCEDURE — 73080 X-RAY EXAM OF ELBOW: CPT

## 2020-10-09 PROCEDURE — 73090 X-RAY EXAM OF FOREARM: CPT | Mod: 26,76,RT

## 2020-10-09 PROCEDURE — 73090 X-RAY EXAM OF FOREARM: CPT

## 2020-10-09 PROCEDURE — 99284 EMERGENCY DEPT VISIT MOD MDM: CPT

## 2020-10-09 PROCEDURE — 25605 CLTX DST RDL FX/EPHYS SEP W/: CPT | Mod: RT

## 2020-10-09 PROCEDURE — 99285 EMERGENCY DEPT VISIT HI MDM: CPT | Mod: 25

## 2020-10-09 PROCEDURE — 73110 X-RAY EXAM OF WRIST: CPT

## 2020-10-09 RX ORDER — OXYCODONE HYDROCHLORIDE 5 MG/1
1 TABLET ORAL
Qty: 4 | Refills: 0
Start: 2020-10-09 | End: 2020-10-09

## 2020-10-09 RX ORDER — OXYCODONE HYDROCHLORIDE 5 MG/1
5 TABLET ORAL ONCE
Refills: 0 | Status: DISCONTINUED | OUTPATIENT
Start: 2020-10-09 | End: 2020-10-09

## 2020-10-09 RX ORDER — ACETAMINOPHEN 500 MG
650 TABLET ORAL ONCE
Refills: 0 | Status: COMPLETED | OUTPATIENT
Start: 2020-10-09 | End: 2020-10-09

## 2020-10-09 RX ADMIN — OXYCODONE HYDROCHLORIDE 5 MILLIGRAM(S): 5 TABLET ORAL at 11:25

## 2020-10-09 RX ADMIN — Medication 650 MILLIGRAM(S): at 11:25

## 2020-10-09 NOTE — ED PROVIDER NOTE - CARE PLAN
Principal Discharge DX:	Distal radial fracture  Secondary Diagnosis:	Other closed fracture of distal end of right ulna, initial encounter   Principal Discharge DX:	Closed fracture of distal end of right radius, unspecified fracture morphology, initial encounter  Secondary Diagnosis:	Other closed fracture of distal end of right ulna, initial encounter

## 2020-10-09 NOTE — ED ADULT NURSE REASSESSMENT NOTE - NS ED NURSE REASSESS COMMENT FT1
Ortho MD returned & states that pt is not ready for d/c at this time, Shahida VALERIO made aware. Pt resting comfortably without complaints & does not appear to be in any acute distress at this time with VSS. Will continue to reassess.

## 2020-10-09 NOTE — ED ADULT NURSE REASSESSMENT NOTE - NS ED NURSE REASSESS COMMENT FT1
Savage VALERIO finished procedure & states that pt is ready for d/c home at this time, Shahida VLAERIO made aware. Shahida VALERIO printing out d/c paperwork now. Pt reports feeling ready to be d/c home. Will continue to reassess.

## 2020-10-09 NOTE — ED PROVIDER NOTE - CLINICAL SUMMARY MEDICAL DECISION MAKING FREE TEXT BOX
Attending MD Capellan: 85F sp mechanical fall from standing with FOOSH injury RUE. Exam with deformity to right wrist, NV intact. Suspect distal radius fx. No other trauma, no head injury, Cervical spine cleared clinically of fracture without need for imaging according to [employer] Criteria. No thoracoabdominal trauma. Plan for XR, analgesia, ortho consultation

## 2020-10-09 NOTE — ED PROVIDER NOTE - PHYSICAL EXAMINATION
Physical Exam:  Gen: NAD, able to ambulate without assistance  Head: NCAT  Lung: CTAB, no respiratory distress, no wheezes/rhonchi/rales B/L, speaking in full sentences  CV: RRR, no murmurs, rubs or gallops, pulses 2+ equal B/L UE   Abd: soft, NT, ND  MSK: swelling over posterior R wrist with bruising w/ tenderness to palpation, pain with wrist flexion and extension, FROM of R elbow and R digits, intact finger to thumb approximation   Neuro: No focal sensory deficits   Skin: Warm, well perfused, ecchymosis to dorsal aspect of R wrist w/ swelling   Neelima Pinedo D.O. -Resident

## 2020-10-09 NOTE — ED PROVIDER NOTE - ATTENDING CONTRIBUTION TO CARE
Attending MD Capellan:  I personally have seen and examined this patient.  Resident note reviewed and agree on plan of care and except where noted.  See HPI, PE, and MDM for details.

## 2020-10-09 NOTE — CONSULT NOTE ADULT - SUBJECTIVE AND OBJECTIVE BOX
85yFemale presents to [facility] ED c/o severe R wrist pain s/p mechanical fall. LHD. Had a FOOSH. Patient denies head hit or LOC. Localizing pain to distal radius. Denies radiation of pain. Pt denies numbness, tingling or burning. . Patient denies any other injuries. Denies any numbness or tingling.    PMH:  Tremors of nervous system    Renal stone    AVM (arteriovenous malformation)    Shingles    Proctitis    IBS (Irritable Bowel Syndrome)    Hypothyroidism    Hypothyroidism      PSH:  Prolapse of uterus    AVM (congenital arteriovenous malformation)    S/P tonsillectomy    S/P appendectomy      AH:    Meds: See med rec    T(C): 36.5 (10-09-20 @ 11:26)  HR: 53 (10-09-20 @ 11:26)  BP: 182/67 (10-09-20 @ 11:26)  RR: 19 (10-09-20 @ 11:26)  SpO2: 99% (10-09-20 @ 11:26)  Wt(kg): --    PE R UE:  Skin intact, visible deformity of wrist, + soft tissue swelling, no ecchymosis; Decreased ROM of Wrist 2/2 pain. Normal Elbow/Shoulder ROM w/o pain. + TTP over DR/Ulna. + Rad Pulse 2+/4. SILT C5-T1, +AIN/PIN/Ulnar/Radial/Musc/Median, soft compartments;    SECONDARY EXAM: Benign, Skin intact, SILT throughout, motor grossly intact throughout, no other orthopedic injuries at this time, compartments soft and compressible    SPINE: Skin intact, no bony tenderness or step-offs appreciated throughout cervical/thoracic/lumbar/sacral spine    L UE: Skin intact, no erythema, ecchymosis, edema, gross deformity, NTTP over the bony prominences of the shoulder/elbow/wrist/hand, painless passive/active ROM of the shoulder/elbow/wrist/hand, C5-T1 SILT, motor grossly intact throughout axillary/musculocutaenous/radial/median/ulnar nerves, + radial pulse    B/L LE: Skin intact, no erythema, ecchymosis, edema, gross deformity, NTTP over the bony prominences of the hip/knee/ankle/foot, painless passive/active ROM of the hip/knee/ankle/foot, L2-S1 SILT, motor grossly intact throughout hip flexors/quads/hams/TA/EHL/FHL/GSC, + DP/PT pulses, no pain with log roll, no pain on axial loading, compartments soft and compressible, calves nontender      Imaging:  XRay R Wrist  3 views of R Wrist demonstrates R distal radius fracture, intrarticular dorsally displaced fx of the distal radius with an associated ulnar styloid neck fx.  No other fx/dislocations noted.     .hblock    Procedure Note:  After verbal consent obtained, ~ 10 cc of 1% Lidocaine injected into area around DR/Ulna as hematoma block. UE hung by fingers and reduction maneuver performed. Sugartong splint applied to Forearm/Wrist and mold held. OR XR obtained which show improved alignment of R DR Fracture. Pt NVI post procedure. Pt tolerated procedure well.    A/P: 85yFemale s/p Mech Fall w/ R Distal Radius Fracture  - Pain control  - Strict Ice/Elevation  - NWB R UE with splint and sling  - Keep splint clean/dry/intact;  - Encourage active finger motion to help with swelling  - Pt aware of possible need for surgical intervention of distal radius fracture. Will FU as outpatient  - Pt made aware of signs and symptoms of compartment syndrome. Aware of need to contact Doctor / Return to ED if symtoms arise.0  - All Pt's / Family Members questions answered, Pt/family understand plan.  - FU w/ Dr. Tierney in one week.     CAse d/w Dr. Tierney who agreees with plan

## 2020-10-09 NOTE — ED PROVIDER NOTE - PROGRESS NOTE DETAILS
Shahida ZULUAGA (PGY-2): Ortho at bedside reducing fracture, post-reduction x-rays ordered, patient being taken to x-ray room now.

## 2020-10-09 NOTE — ED PROVIDER NOTE - NS ED ROS FT
CONSTITUTIONAL: no lightheadedness, no dizziness  CV: No chest pain, no palpitations  RESP: No SOB, no cough  MSK: +R wrist pain   SKIN: +swelling and bruising to R wrist   NEURO: no decreased sensation/paresthesias

## 2020-10-09 NOTE — ED PROVIDER NOTE - OBJECTIVE STATEMENT
85y F w/ PMHx 85y F w/ PMHx AVM of small intestine, Hypothyroidism, tremor of LUE presenting with R wrist pain after mechanical trip and fall. Patient states she was climbing her three steps to her apartment, when her foot got stuck on her top step and she fell forward with her R arm outstretched. Denies head trauma or LOC, not on blood thinners. States she was able to stand up by herself without assistance and called her co-worker for help. Patient endorses swelling over her dorsal R wrist with associated pain with flexion and extension of her wrist. Denies prodromal symptoms prior to fall, numbness or tingling in her RUE, laceration or abrasion to her skin.

## 2020-10-09 NOTE — ED ADULT NURSE REASSESSMENT NOTE - NS ED NURSE REASSESS COMMENT FT1
Report received from Dinora WARNER. Pt A&Ox4. Pt reports tripping & falling at home onto Rt outstretched hand. Edema noted to Rt wrist & forearm. +2 B/L radial pulses with no distal sensorimotor deficits. Currently awaiting ortho MD to evaluate & intervene. Pt resting comfortably without complaints & does not appear to be in any acute distress at this time with VSS & daughter at bedside. Will continue to reassess.

## 2020-10-09 NOTE — ED PROVIDER NOTE - PATIENT PORTAL LINK FT
You can access the FollowMyHealth Patient Portal offered by [facility] by registering at the following website: [email or web address]. By joining [employer]’s FollowMyHealth portal, you will also be able to view your health information using other applications (apps) compatible with our system. You can access the FollowMyHealth Patient Portal offered by [facility] by registering at the following website: [email or web address]. By joining [employer]’s FollowMyHealth portal, you will also be able to view your health information using other applications (apps) compatible with our system.

## 2020-10-09 NOTE — ED PROVIDER NOTE - CHIEF COMPLAINT
The patient is a 85y Female complaining of The patient is a 85y Female complaining of R wrist pain after fall

## 2020-10-09 NOTE — ED PROVIDER NOTE - NSFOLLOWUPINSTRUCTIONS_ED_ALL_ED_FT
- Please keep your right arm elevated above your heart when you are sitting or resting     -No heavy lifting of right arm, keep your arm in the sling when you go out     - Keep splint clean, dry, and intact     - Encourage active finger motion to help with swelling    -Please return to the emergency department if you start to experience numbness or tingling in your right arm, worsening pain, inability to move fingers without severe pain, or if your fingers start to feel cold    -Please call to schedule follow-up Dr. Tierney in one week, please [phone] for your appointment    To control your pain at home, you should take Tylenol 650mg-1000mg every 6 to 8 hours. Limit your maximum daily Tylenol from all sources to 4000mg. Be aware that many other medications contain acetaminophen which is also known as Tylenol.

## 2020-10-09 NOTE — ED ADULT NURSE NOTE - OBJECTIVE STATEMENT
Pt is an 85y F [facility] Pt is an 85y F PMH c/o R wrist pain s/p fall. Pt endorses tripping and falling with outstretched arms, up a single step. Endorses R wrist pain, wrist appears swollen and ecchymotic. Denies head strike, AC, LOC, pain elsewhere, chest pain, SOB, other areas of injury. A&ox4, ALDANA, lungs clear, distal pulses intact, abdomen soft nontender, skin intact no abrasions noted. Side rails up for safety, call bell and personal items within reach, instructed to call for assistance, verbalizes understanding. Will continue to monitor.

## 2020-10-09 NOTE — ED ADULT TRIAGE NOTE - AS TEMP SITE
Patient needs to be seen and can you please put it in at the end of the day tomorrow or somewhere   oral

## 2020-10-09 NOTE — ED PROVIDER NOTE - PRINCIPAL DIAGNOSIS
Distal radial fracture Closed fracture of distal end of right radius, unspecified fracture morphology, initial encounter

## 2020-10-12 ENCOUNTER — APPOINTMENT (OUTPATIENT)
Dept: ORTHOPEDIC SURGERY | Facility: CLINIC | Age: 85
End: 2020-10-12
Payer: MEDICARE

## 2020-10-12 PROCEDURE — 29125 APPL SHORT ARM SPLINT STATIC: CPT | Mod: RT

## 2020-10-12 PROCEDURE — 73110 X-RAY EXAM OF WRIST: CPT | Mod: RT

## 2020-10-12 PROCEDURE — 99214 OFFICE O/P EST MOD 30 MIN: CPT | Mod: 25

## 2020-10-13 ENCOUNTER — OUTPATIENT (OUTPATIENT)
Dept: OUTPATIENT SERVICES | Facility: HOSPITAL | Age: 85
LOS: 1 days | End: 2020-10-13
Payer: MEDICARE

## 2020-10-13 VITALS
HEIGHT: 62 IN | TEMPERATURE: 98 F | HEART RATE: 68 BPM | RESPIRATION RATE: 19 BRPM | SYSTOLIC BLOOD PRESSURE: 147 MMHG | WEIGHT: 155.43 LBS | DIASTOLIC BLOOD PRESSURE: 70 MMHG | OXYGEN SATURATION: 96 %

## 2020-10-13 DIAGNOSIS — S52.501A UNSPECIFIED FRACTURE OF THE LOWER END OF RIGHT RADIUS, INITIAL ENCOUNTER FOR CLOSED FRACTURE: ICD-10-CM

## 2020-10-13 DIAGNOSIS — Z01.818 ENCOUNTER FOR OTHER PREPROCEDURAL EXAMINATION: ICD-10-CM

## 2020-10-13 RX ORDER — IBUPROFEN 200 MG
1 TABLET ORAL
Qty: 0 | Refills: 0 | DISCHARGE

## 2020-10-13 RX ORDER — ACETAMINOPHEN 500 MG
3 TABLET ORAL
Qty: 0 | Refills: 0 | DISCHARGE

## 2020-10-13 NOTE — H&P PST ADULT - NSICDXPASTSURGICALHX_GEN_ALL_CORE_FT
PAST SURGICAL HISTORY:  AVM (congenital arteriovenous malformation) enteroscopy small intestines twice 2013 (twice 2012)    Prolapse of uterus sling 2007    S/P appendectomy     S/P tonsillectomy

## 2020-10-13 NOTE — PHYSICAL EXAM
[de-identified] : Patient is WDWN, alert, and in no acute distress. Breathing is unlabored. She is grossly oriented to person, place, and time. \par \par Right hand:Sugartong splint is removed\par Deformity with radial deviation of right hand\par Digits FROM\par Normal sensation\par  [de-identified] : AP, lateral and oblique views of the right wrist were obtained today and revealed a displaced comminuted intra-articular distal radius fracture with significant shortening and loss of radial angulation.There is also an ulna styloid fracture. \par

## 2020-10-13 NOTE — DISCUSSION/SUMMARY
[FreeTextEntry1] : \par \par The underlying pathophysiology was reviewed with the patient. Treatment options were discussed including; nonsurgical and surgical intervention. \par \par The patient wishes to proceed with ORIF right distal radius with possible CTR at this time. The risks and benefits were reviewed with the patient. All of her questions were answered. She will meet with our surgical scheduler. \par \par A new short arm splint was applied.\par

## 2020-10-13 NOTE — H&P PST ADULT - HISTORY OF PRESENT ILLNESS
84 y/o female with PMH of IBS, hypothyroidism- s/p fall with fracture right distal radius scheduled for surgery in [facility]. Denies any other associated injury. History reviewed over phone and will do a physical on admit

## 2020-10-13 NOTE — H&P PST ADULT - NSANTHOSAYNRD_GEN_A_CORE
No. INGRID screening performed.  STOP BANG Legend: 0-2 = LOW Risk; 3-4 = INTERMEDIATE Risk; 5-8 = HIGH Risk

## 2020-10-13 NOTE — END OF VISIT
[FreeTextEntry3] : I, Vinay Vallejo MD, ordering physician, have read and attest that all the information, medical decision making and discharge instructions within are true and accurate.

## 2020-10-13 NOTE — H&P PST ADULT - NSICDXPASTMEDICALHX_GEN_ALL_CORE_FT
PAST MEDICAL HISTORY:  AVM (arteriovenous malformation) small intestinal    H/O basal cell carcinoma excision     Hypothyroidism     IBS (Irritable Bowel Syndrome)     Proctitis hx asymptomatic    Renal stone asymptomatic    Tremors of nervous system

## 2020-10-13 NOTE — HISTORY OF PRESENT ILLNESS
[Left] : left hand dominant [FreeTextEntry1] : 84 yo LHD female presents for initial evaluation for a right  wrist injury sustained 10/10/20.  She was holding onto a railing and slipped, while gripping onto railing.  She was evaluated at [facility] ED, where x-rays revealed a comminut and displaced right distal radius and ulnar fractures.  She was treated with a closed reduction and placed in a sugartong splint and sling.  She has continued swelling and mild pain in the distal fingertips.  She has been taking Tylenol with some relief and Vicodin as prescribed in the ED.

## 2020-10-13 NOTE — ADDENDUM
[FreeTextEntry1] : I, Katy Bryant wrote this note acting as a scribe for Dr. Vinay Vallejo on Oct 12, 2020.

## 2020-10-13 NOTE — H&P PST ADULT - ASSESSMENT
86 y/o female with right distal radius fracture  PLAN   ORIF right distal radius fracture  possible right carpal tunnel release   Medical clearance  covid testing-10/14/  pre op instructions provided-meds, hernesto hex wash

## 2020-10-14 ENCOUNTER — OUTPATIENT (OUTPATIENT)
Dept: OUTPATIENT SERVICES | Facility: HOSPITAL | Age: 85
LOS: 1 days | End: 2020-10-14
Payer: MEDICARE

## 2020-10-14 DIAGNOSIS — Z11.59 ENCOUNTER FOR SCREENING FOR OTHER VIRAL DISEASES: ICD-10-CM

## 2020-10-14 LAB — SARS-COV-2 RNA SPEC QL NAA+PROBE: SIGNIFICANT CHANGE UP

## 2020-10-14 PROCEDURE — U0003: CPT

## 2020-10-14 PROCEDURE — G0463: CPT

## 2020-10-15 ENCOUNTER — TRANSCRIPTION ENCOUNTER (OUTPATIENT)
Age: 85
End: 2020-10-15

## 2020-10-15 NOTE — ASU PATIENT PROFILE, ADULT - PMH
AVM (arteriovenous malformation)  small intestinal  H/O basal cell carcinoma excision    Hypothyroidism    IBS (Irritable Bowel Syndrome)    Proctitis  hx asymptomatic  Renal stone  asymptomatic  Tremors of nervous system

## 2020-10-16 ENCOUNTER — RESULT REVIEW (OUTPATIENT)
Age: 85
End: 2020-10-16

## 2020-10-16 ENCOUNTER — APPOINTMENT (OUTPATIENT)
Dept: ORTHOPEDIC SURGERY | Facility: HOSPITAL | Age: 85
End: 2020-10-16

## 2020-10-16 ENCOUNTER — OUTPATIENT (OUTPATIENT)
Dept: OUTPATIENT SERVICES | Facility: HOSPITAL | Age: 85
LOS: 1 days | End: 2020-10-16
Payer: MEDICARE

## 2020-10-16 VITALS
TEMPERATURE: 97 F | DIASTOLIC BLOOD PRESSURE: 50 MMHG | SYSTOLIC BLOOD PRESSURE: 133 MMHG | RESPIRATION RATE: 16 BRPM | OXYGEN SATURATION: 95 % | HEART RATE: 70 BPM

## 2020-10-16 VITALS
OXYGEN SATURATION: 96 % | RESPIRATION RATE: 19 BRPM | SYSTOLIC BLOOD PRESSURE: 147 MMHG | HEIGHT: 62 IN | WEIGHT: 154.98 LBS | TEMPERATURE: 98 F | DIASTOLIC BLOOD PRESSURE: 70 MMHG | HEART RATE: 68 BPM

## 2020-10-16 DIAGNOSIS — S52.501A UNSPECIFIED FRACTURE OF THE LOWER END OF RIGHT RADIUS, INITIAL ENCOUNTER FOR CLOSED FRACTURE: ICD-10-CM

## 2020-10-16 PROCEDURE — C1713: CPT

## 2020-10-16 PROCEDURE — 76000 FLUOROSCOPY <1 HR PHYS/QHP: CPT

## 2020-10-16 PROCEDURE — 64721 CARPAL TUNNEL SURGERY: CPT | Mod: RT

## 2020-10-16 PROCEDURE — 25609 OPTX DST RD XART FX/EP SEP3+: CPT | Mod: RT

## 2020-10-16 RX ORDER — OXYCODONE HYDROCHLORIDE 5 MG/1
5 TABLET ORAL ONCE
Refills: 0 | Status: DISCONTINUED | OUTPATIENT
Start: 2020-10-16 | End: 2020-10-16

## 2020-10-16 RX ORDER — HYDROMORPHONE HYDROCHLORIDE 2 MG/ML
0.5 INJECTION INTRAMUSCULAR; INTRAVENOUS; SUBCUTANEOUS
Refills: 0 | Status: DISCONTINUED | OUTPATIENT
Start: 2020-10-16 | End: 2020-10-16

## 2020-10-16 RX ORDER — APREPITANT 80 MG/1
40 CAPSULE ORAL ONCE
Refills: 0 | Status: COMPLETED | OUTPATIENT
Start: 2020-10-16 | End: 2020-10-16

## 2020-10-16 RX ORDER — CHLORHEXIDINE GLUCONATE 213 G/1000ML
1 SOLUTION TOPICAL ONCE
Refills: 0 | Status: COMPLETED | OUTPATIENT
Start: 2020-10-16 | End: 2020-10-16

## 2020-10-16 RX ORDER — OXYCODONE HYDROCHLORIDE 5 MG/1
1 TABLET ORAL
Qty: 5 | Refills: 0
Start: 2020-10-16

## 2020-10-16 RX ORDER — CEFAZOLIN SODIUM 1 G
2000 VIAL (EA) INJECTION ONCE
Refills: 0 | Status: COMPLETED | OUTPATIENT
Start: 2020-10-16 | End: 2020-10-16

## 2020-10-16 RX ORDER — SODIUM CHLORIDE 9 MG/ML
1000 INJECTION, SOLUTION INTRAVENOUS
Refills: 0 | Status: DISCONTINUED | OUTPATIENT
Start: 2020-10-16 | End: 2020-10-16

## 2020-10-16 RX ORDER — IBUPROFEN 200 MG
1 TABLET ORAL
Qty: 30 | Refills: 0
Start: 2020-10-16

## 2020-10-16 RX ADMIN — APREPITANT 40 MILLIGRAM(S): 80 CAPSULE ORAL at 08:41

## 2020-10-16 RX ADMIN — CHLORHEXIDINE GLUCONATE 1 APPLICATION(S): 213 SOLUTION TOPICAL at 08:41

## 2020-10-16 RX ADMIN — SODIUM CHLORIDE 75 MILLILITER(S): 9 INJECTION, SOLUTION INTRAVENOUS at 12:25

## 2020-10-16 NOTE — ASU DISCHARGE PLAN (ADULT/PEDIATRIC) - CARE PROVIDER_API CALL
Vinay Vallejo  ORTHOPAEDIC SURGERY  [address], [address]  [place] [address]  Phone: [phone]  Fax: [phone]  Follow Up Time:

## 2020-10-16 NOTE — ASU DISCHARGE PLAN (ADULT/PEDIATRIC) - ACTIVITY LEVEL
No weight bearing/Elevate extremity No tub baths/No heavy lifting/No weight bearing/Elevate extremity

## 2020-10-16 NOTE — ASU DISCHARGE PLAN (ADULT/PEDIATRIC) - CALL YOUR DOCTOR IF YOU HAVE ANY OF THE FOLLOWING:
Fever greater than (need to indicate Fahrenheit or Celsius) Bleeding that does not stop/Wound/Surgical Site with redness, or foul smelling discharge or pus/Fever greater than (need to indicate Fahrenheit or Celsius)/Pain not relieved by Medications/Swelling that gets worse/Numbness, tingling, color or temperature change to extremity

## 2020-10-16 NOTE — BRIEF OPERATIVE NOTE - NSICDXBRIEFPROCEDURE_GEN_ALL_CORE_FT
PROCEDURES:  Open reduction and internal fixation of fracture of distal right radius 16-Oct-2020 11:22:48  Zaheer Owens

## 2020-10-16 NOTE — ASU DISCHARGE PLAN (ADULT/PEDIATRIC) - ASU DC SPECIAL INSTRUCTIONSFT
Use your posey block while lying in bed to help keep your arm elevated.   Use your sling while ambulating to help keep your arm elevated.   Elevating your arm helps prevent pain and swelling.

## 2020-10-26 ENCOUNTER — APPOINTMENT (OUTPATIENT)
Dept: ORTHOPEDIC SURGERY | Facility: CLINIC | Age: 85
End: 2020-10-26
Payer: MEDICARE

## 2020-10-26 PROCEDURE — 73110 X-RAY EXAM OF WRIST: CPT | Mod: RT

## 2020-10-26 PROCEDURE — 99024 POSTOP FOLLOW-UP VISIT: CPT

## 2020-10-26 PROCEDURE — 29075 APPL CST ELBW FNGR SHORT ARM: CPT | Mod: 58,RT

## 2020-10-26 PROCEDURE — 99072 ADDL SUPL MATRL&STAF TM PHE: CPT

## 2020-10-27 NOTE — HISTORY OF PRESENT ILLNESS
[de-identified] : s/p ORIF right distal radius, right carpal tunnel release, application of right short arm splint [de-identified] : The patient is a 85 year old female who returns for the 1st postoperative visit after undergoing ORIF right distal radius, right carpal tunnel release at [facility]. The surgery was performed on 10/16/2020. The patient is recovering at home. She reports mild postoperative pain.  [de-identified] : Patient is WDWN, alert, and in no acute distress. Breathing is unlabored. She is grossly oriented to person, place, and time. \par \par Right hand: Incision is healed. There are no signs of infection. Sutures were removed. Normal amount of postoperative edema and tenderness present. \par \par Right wrist: Incision is healed. There are no signs of infection. Normal amount of postoperative edema and tenderness present. Cast is well fitting. The padding is intact with no signs of skin irritation. There is no pain. Neurologically intact with brisk capillary refill in all five digits. Digits are moving freely. Sensation is intact to light touch.  [de-identified] : AP, lateral and oblique views of the right wrist were obtained and revealed a distal radius fracture stabilized with a Synthes locked volar plate.  [de-identified] : Sutures were removed. Benzoin and steri strips were applied over the incision sites. \par The underlying pathophysiology was reviewed with the patient. Treatment options were discussed including; observation, NSAIDS, and cast immobilization. \par \par A right short arm cast was applied. Cast care instructions were reviewed. \par NSAIDs as tolerated. \par Gentle ROM exercises involving the shoulder and elbow as well pumping the hand were advised.\par Follow up in 6 weeks for cast removal and repeat x-rays.

## 2020-10-27 NOTE — ADDENDUM
[FreeTextEntry1] : I, Katy Bryant wrote this note acting as a scribe for Dr. Vinay Vallejo on Oct 26, 2020.

## 2020-11-23 ENCOUNTER — APPOINTMENT (OUTPATIENT)
Dept: ORTHOPEDIC SURGERY | Facility: CLINIC | Age: 85
End: 2020-11-23
Payer: MEDICARE

## 2020-11-23 PROCEDURE — 29125 APPL SHORT ARM SPLINT STATIC: CPT | Mod: 58,RT

## 2020-11-23 PROCEDURE — 99024 POSTOP FOLLOW-UP VISIT: CPT

## 2020-11-23 PROCEDURE — 73110 X-RAY EXAM OF WRIST: CPT | Mod: RT

## 2020-11-23 NOTE — HISTORY OF PRESENT ILLNESS
[de-identified] : s/p ORIF right distal radius, right carpal tunnel release, application of right short arm splint [de-identified] : The patient is a 85 year old female who returns for the 2nd postoperative visit after undergoing ORIF right distal radius, right carpal tunnel release at [facility]. The surgery was performed on 10/16/2020. The patient is recovering at home. She reports mild postoperative pain. She returns stating that her right hand is sore. She states that she would like to do physical therapy at home. She had a short arm cast applied on 10/26/2020 .She returns today for cast removal. [de-identified] : Patient is WDWN, alert, and in no acute distress. Breathing is unlabored. She is grossly oriented to person, place, and time. \par \par Right hand: Incision is healed. There are no signs of infection. Normal amount of postoperative edema and tenderness present. \par \par Right wrist: Cast is removed\par  Incision is healed. There are no signs of infection. Normal amount of postoperative edema and tenderness present. Wrist Flex 30, ext 30\par Digits FROM\par Sensation is normal. [de-identified] : AP, lateral and oblique views of the right wrist were obtained and revealed a distal radius fracture stabilized with a Synthes locked volar plate. Hardware is in place.  [de-identified] : A short arm splint was applied.\par She was advised to remove the splint for ROM exercises. \par Return in 6 weeks\par Patient is advised to use a brace or splint for support. \par Massage the scar with vitamin E oil. \par The patient was advised to soak the hand in warm water and Epsom salt. \par Patient is encouraged to increase the use of the hand. \par Range of motion and strengthening exercises were reviewed. \par Topical analgesics as needed. \par NSAIDs as tolerated. \par Follow Up in 6 weeks.

## 2020-11-23 NOTE — ADDENDUM
[FreeTextEntry1] : I, Katy Bryant wrote this note acting as a scribe for Dr. Vinay Vallejo on Nov 23, 2020.

## 2020-12-07 ENCOUNTER — EMERGENCY (EMERGENCY)
Facility: HOSPITAL | Age: 85
LOS: 1 days | Discharge: HOME | End: 2020-12-07
Attending: STUDENT IN AN ORGANIZED HEALTH CARE EDUCATION/TRAINING PROGRAM
Payer: MEDICARE

## 2020-12-07 VITALS
TEMPERATURE: 98 F | HEART RATE: 56 BPM | WEIGHT: 154.98 LBS | DIASTOLIC BLOOD PRESSURE: 72 MMHG | OXYGEN SATURATION: 96 % | SYSTOLIC BLOOD PRESSURE: 164 MMHG | RESPIRATION RATE: 16 BRPM | HEIGHT: 62 IN

## 2020-12-07 VITALS
SYSTOLIC BLOOD PRESSURE: 158 MMHG | OXYGEN SATURATION: 96 % | DIASTOLIC BLOOD PRESSURE: 57 MMHG | HEART RATE: 63 BPM | RESPIRATION RATE: 16 BRPM | TEMPERATURE: 99 F

## 2020-12-07 PROCEDURE — 99284 EMERGENCY DEPT VISIT MOD MDM: CPT | Mod: 25

## 2020-12-07 PROCEDURE — 12005 RPR S/N/A/GEN/TRK12.6-20.0CM: CPT

## 2020-12-07 PROCEDURE — 70450 CT HEAD/BRAIN W/O DYE: CPT

## 2020-12-07 PROCEDURE — 90715 TDAP VACCINE 7 YRS/> IM: CPT

## 2020-12-07 PROCEDURE — 12004 RPR S/N/AX/GEN/TRK7.6-12.5CM: CPT

## 2020-12-07 PROCEDURE — 90471 IMMUNIZATION ADMIN: CPT

## 2020-12-07 PROCEDURE — 70450 CT HEAD/BRAIN W/O DYE: CPT | Mod: 26

## 2020-12-07 RX ORDER — TETANUS TOXOID, REDUCED DIPHTHERIA TOXOID AND ACELLULAR PERTUSSIS VACCINE, ADSORBED 5; 2.5; 8; 8; 2.5 [IU]/.5ML; [IU]/.5ML; UG/.5ML; UG/.5ML; UG/.5ML
0.5 SUSPENSION INTRAMUSCULAR ONCE
Refills: 0 | Status: COMPLETED | OUTPATIENT
Start: 2020-12-07 | End: 2020-12-07

## 2020-12-07 RX ADMIN — TETANUS TOXOID, REDUCED DIPHTHERIA TOXOID AND ACELLULAR PERTUSSIS VACCINE, ADSORBED 0.5 MILLILITER(S): 5; 2.5; 8; 8; 2.5 SUSPENSION INTRAMUSCULAR at 16:14

## 2020-12-07 NOTE — ED PROVIDER NOTE - OBJECTIVE STATEMENT
85 year old fm with pmhx of hypothyroid, avm, ibs, presents to the ED after having a mechanical fall on one step stool backwards. patient reports falling backwards and hitting her head on corner of table. patient denies loc, weakness, nvd, change in vision, neurological symptoms. patient denies feeling weak, dizzy, lethargic prior, at, and post fall. patient denies blood thinner use. patient reports falling on back but denies any pain or injury patient reports feeling great denies any pain or injuries, or c.o some head pain at loc of laceration. patient was able to get up on her own with assistance. patient denies CP, sob, weakness, vision changes, HA, nvd, syncope, confusion, neurological symptoms, abd pain, fevers, difficulty walking

## 2020-12-07 NOTE — ED ADULT NURSE REASSESSMENT NOTE - NS ED NURSE REASSESS COMMENT FT1
Patient discharged from ED. Discharge paperwork provided, delay in d/c due to pending discharge paperwork, family waiting outside waiting room. Verbalized understanding of teaching. Vital signs stable, afebrile. Patient leaving unit ambulatory, free from harm.

## 2020-12-07 NOTE — ED ADULT NURSE REASSESSMENT NOTE - NS ED NURSE REASSESS COMMENT FT1
Pt remains AAOx4, NAD, resp nonlabored, resting comfortably in bed with call bell at bedside. Pt denies headache, dizziness, chest pain, palpitations, SOB, abd pain, n/v/d, urinary symptoms, fevers, chills, weakness at this time. Pt awaiting dispo. Safety maintained.

## 2020-12-07 NOTE — ED PROVIDER NOTE - CARE PLAN
Principal Discharge DX:	Fall   Principal Discharge DX:	Fall  Secondary Diagnosis:	Scalp laceration

## 2020-12-07 NOTE — ED PROVIDER NOTE - PHYSICAL EXAMINATION
On Physical Exam:  General: well appearing, in NAD, speaking clearly in full sentences and without difficulty; cooperative with exam  HEENT: laceration noted on posterior head in C shape not bleeding, PERRL, MMM  Neck: no neck tenderness, no nuchal rigidity  Cardiac: normal s1, s2; RRR; no MGR  Lungs: CTABL  Abdomen: soft nontender/nondistended  : no bladder tenderness or distension  Skin: warm, intact, no rash  Extremities: no peripheral edema, no gross deformities  Neuro: no gross neurologic deficits

## 2020-12-07 NOTE — ED PROVIDER NOTE - NS ED ROS FT
Review of Systems:  · Constitutional: no chills, no fever, no night sweats, no weight loss  . Head: laceration to posterior head, head pain at site of lac  · Nose: no epistaxis  · Mouth/Throat: no difficulty in swallowing, trachea midline, uvula midline  . Cardio: No CP, no palpitations, no chest pressure  · Respiratory: no cough, no exertional dyspnea, no hemoptysis, no orthopnea, no shortness of breath  · Gastrointestinal: no abdominal pain, no diarrhea, no melena, no nausea, no vomiting  · Genitourinary: no difficulty urinating, no dysuria, no hematuria  · MUSCULOSKELETAL: FROM of all extremities  · Skin: no abrasion; no bruising; no laceration  · Neurological: no change in level of consciousness, no headache, no seizures, no LOC  · Psychiatric: no anxiety, no depression  · ROS STATEMENT: all other ROS negative except as per HPI

## 2020-12-07 NOTE — ED PROVIDER NOTE - NSFOLLOWUPINSTRUCTIONS_ED_ALL_ED_FT
There are no signs of emergency conditions requiring admission to the hospital on today's workup.  Based on the evaluation, a presumptive diagnosis was made, however, further evaluation may be required by your primary care physician or a specialist for a more definitive diagnosis.  Therefore, please follow-up as directed or return to the Emergency Department if your symptoms change or worsen.    We recommend that you:  1. See your primary care physician within the next 72 hours for follow up.  Bring a copy of your discharge paperwork (including any test results) to your doctor.  2. Keep sutures dry for 24 hours then clean gently with soap and warm water.    3. Apply bacitracin ointment twice a day for 5 days.    4. Return to the ER in 7 to 10 days for suture removal.          Return to the ER for any persistent/worsening symptoms or if you experience any new symptoms such as fever, redness, numbness , you notice a new rash, increased irritation, abnormal discharge, or any concerning symptoms.

## 2020-12-07 NOTE — ED PROVIDER NOTE - PATIENT PORTAL LINK FT
You can access the FollowMyHealth Patient Portal offered by [facility] by registering at the following website: [email or web address]. By joining [facility]’s FollowMyHealth portal, you will also be able to view your health information using other applications (apps) compatible with our system.

## 2020-12-07 NOTE — ED ADULT NURSE NOTE - NS ED NURSE RECORD ANOTHER HT AND WT
DATE OF PROCEDURE:  2019    SURGEON:  Iban Elliott D.O.    PREOPERATIVE DIAGNOSIS:  Term pregnancy.    POSTOPERATIVE DIAGNOSIS:  Term pregnancy.    PROCEDURE PERFORMED:  Vaginal delivery after induction of labor.    LABOR AND DELIVERY HISTORY:  The patient is a 33-year-old  2, para 1,  1-0-0-1, who presents for induction of labor.  On presentation, she was 1-2  firmish and -3 and with a category 1 heart tracing.  Her prenatal care was  essentially unremarkable.  She was on Lexapro 15 mg throughout the pregnancy  for anxiety and did well throughout.     DESCRIPTION OF PROCEDURE:  On presentation, the Pitocin was started.  She  progressed slowly.  I performed an amniotomy this morning and she was 2 cm,  50%, -3 and had nicely applied.  The fetal heart rate tracing remained reactive  before, during, and after the amniotomy.  She then progressed slowly through  labor.  She had received an epidural just prior to the amniotomy and slowly  progressed to the latent phase and then accelerated.  The patient was given a  test push and brought the baby down nicely, so we continued pushing for the  next 2 contractions, and she was ready into extension and .  She was  sterilely prepped and draped in over the course of 1 contraction she pushed the  head of the baby out, we asked to stop pushing and the baby was gently  maneuvered through the birth canal, avoiding any injury to the perineum.  The  oral and nasopharynx were suctioned.  The cord was clamped doubly after 30  seconds and the dad was allowed to cut the cord and the baby was given to the  mother to hold.  A segment of cord was sent for routine cord gas.  Cord blood  was delivered.  The placenta was delivered and it was  spontaneously,  delivered via Schultze and appeared intact with respect to cotyledons and  membranes.  The uterus was explored.  The perineum and cervix were explored.  There was no injury.  Mother and infant are both  doing well.  The estimated  blood loss was 250 mL.  Routine postpartum   management is planned, and sponge, instrument, needle counts had been called  for and were correct x3.         DATE AND TIME Iban Elliott D.O.      SAMIA/MEDQ-#[id number]  DD:  11/12/2019 16:54:44      DT:  11/12/2019 17:51:36        [facility]                      MRN#: [id number]  ROOM: [id number] E1  ACCT#: [id number]  REPORT OF OPERATION   Yes

## 2020-12-07 NOTE — ED PROVIDER NOTE - CLINICAL SUMMARY MEDICAL DECISION MAKING FREE TEXT BOX
85F presenting s/p fall with lac, o/w well appearing, sounds mechanical in nature, if negative workup otherwise patient requesting to go home and I believe this is appropriate if able to perform lac repair + tetanus & has negative imaging studies.

## 2020-12-07 NOTE — ED ADULT NURSE NOTE - NSIMPLEMENTINTERV_GEN_ALL_ED
Implemented All Fall Risk Interventions:  Ellery to call system. Call bell, personal items and telephone within reach. Instruct patient to call for assistance. Room bathroom lighting operational. Non-slip footwear when patient is off stretcher. Physically safe environment: no spills, clutter or unnecessary equipment. Stretcher in lowest position, wheels locked, appropriate side rails in place. Provide visual cue, wrist band, yellow gown, etc. Monitor gait and stability. Monitor for mental status changes and reorient to person, place, and time. Review medications for side effects contributing to fall risk. Reinforce activity limits and safety measures with patient and family.

## 2020-12-07 NOTE — ED ADULT NURSE NOTE - OBJECTIVE STATEMENT
Pt is an 86 y/o F, PMH AVM, hypothyroid, presenting to ED BIBEMS s/p mechanical fall at home. Pt states she was coming down from a ladder, missed a step, fell backwards and hit her head on her coffee table. Pt denies any LOC, remembers the entire event. 6mm lac noted to R side of back of pt head, bandaged by EMS (removed by ED MD for evaluation). Pt denies any current pain to neck/head/back. Pt AAOx4, no confusion, neurologically intact, PERRLA 4mm, no blurry vision, no facial droop, NAD, resp nonlabored, abdomen soft nontender nondistended, pt following commands, Vivi independently, 5/5 equal strength to extremities x 4, no sensory deficits, no numbness/tingling, strong peripheral pulses x 4, cap refill < 2 seconds, skin warm and dry. Pt states she ambulates independently at home, does not use any assistive devices. Pt admits to falling previously on 10/16 (mechanical fall), endured R arm fractures which she had a cast placed for, recently removed. Pt states these were the only instances of falling, both mechanical events. Pt denies headache, dizziness, chest pain, palpitations, cough, SOB, abdominal pain, n/v/d, urinary symptoms, fevers, chills, weakness at this time. Pt resting in bed, changed into hospital gown with call bell at bedside and safety maintained. SEE NEURO FLOW SHEET IN PT CHART.

## 2020-12-07 NOTE — ED ADULT NURSE REASSESSMENT NOTE - NS ED NURSE REASSESS COMMENT FT1
Report received from ALANA Byrnes. Pt AAOx4, resp nonlabored, skin warm/dry, resting comfortably in bed. Pt c/o fall with lac to back of head. Patient states ambulates independently but has had multiple falls. Pt denies headache, dizziness, chest pain, palpitations, SOB, abd pain, n/v/d, urinary symptoms, fevers, chills, weakness at this time. Pt awaiting discharge. Safety maintained.

## 2021-01-04 ENCOUNTER — APPOINTMENT (OUTPATIENT)
Dept: ORTHOPEDIC SURGERY | Facility: CLINIC | Age: 86
End: 2021-01-04
Payer: MEDICARE

## 2021-01-04 DIAGNOSIS — G56.01 CARPAL TUNNEL SYNDROME, RIGHT UPPER LIMB: ICD-10-CM

## 2021-01-04 DIAGNOSIS — Z98.890 OTHER SPECIFIED POSTPROCEDURAL STATES: ICD-10-CM

## 2021-01-04 PROCEDURE — 73110 X-RAY EXAM OF WRIST: CPT | Mod: RT

## 2021-01-04 PROCEDURE — 99024 POSTOP FOLLOW-UP VISIT: CPT

## 2021-01-04 NOTE — HISTORY OF PRESENT ILLNESS
[de-identified] : s/p ORIF right distal radius, right carpal tunnel release, application of right short arm splint [de-identified] : The patient is a 85 year old female who returns for the 3rd postoperative visit after undergoing ORIF right distal radius, right carpal tunnel release at [facility]. The surgery was performed on 10/16/2020. The patient is recovering at home. She reports mild postoperative pain. She returns stating that her right hand is sore. She states that she would like to do physical therapy at home. She had a short arm cast applied on 10/26/2020 and removed 11/23/2020. She returns at the 3 month tay stating that she has recently fallen in mid Dec. She states that after the fall she has felt that something was wrong. Denies taking pain medication. \par \par She is also c/o pain radiating from her right palm to the index fingers.  [de-identified] : Patient is WDWN, alert, and in no acute distress. Breathing is unlabored. She is grossly oriented to person, place, and time. \par \par Right hand: Incision is healed. There are no signs of infection. Normal amount of postoperative edema and tenderness present. \par \par Right wrist: \par Incision is healed. There are no signs of infection. Normal amount of postoperative edema and tenderness present. Wrist Flex 30, ext 30\par Digits FROM\par Sensation is normal. [de-identified] : AP, lateral and oblique views of the right wrist were obtained and revealed a distal radius fracture stabilized with a Synthes locked volar plate. Hardware is in place.  [de-identified] : Patient was informed that there was no new fracture seen on the x-rays taken today. \par The patient was advised to soak the hand in warm water and Epsom salt. \par Range of motion and strengthening exercises were reviewed. \par The option of any injection was injection was discussed. \par NSAIDs as tolerated. \par Follow Up in 3 months.

## 2021-01-04 NOTE — ADDENDUM
[FreeTextEntry1] : I, Katy Bryant wrote this note acting as a scribe for Dr. Vinay Vallejo on Jan 04, 2021.

## 2021-01-28 ENCOUNTER — APPOINTMENT (OUTPATIENT)
Dept: ORTHOPEDIC SURGERY | Facility: CLINIC | Age: 86
End: 2021-01-28
Payer: MEDICARE

## 2021-01-28 VITALS
BODY MASS INDEX: 30.18 KG/M2 | SYSTOLIC BLOOD PRESSURE: 146 MMHG | TEMPERATURE: 96.2 F | DIASTOLIC BLOOD PRESSURE: 76 MMHG | HEIGHT: 62 IN | HEART RATE: 67 BPM | WEIGHT: 164 LBS

## 2021-01-28 DIAGNOSIS — M77.8 OTHER ENTHESOPATHIES, NOT ELSEWHERE CLASSIFIED: ICD-10-CM

## 2021-01-28 DIAGNOSIS — M47.816 SPONDYLOSIS W/OUT MYELOPATHY OR RADICULOPATHY, LUMBAR REGION: ICD-10-CM

## 2021-01-28 DIAGNOSIS — M54.12 RADICULOPATHY, CERVICAL REGION: ICD-10-CM

## 2021-01-28 PROCEDURE — 72100 X-RAY EXAM L-S SPINE 2/3 VWS: CPT

## 2021-01-28 PROCEDURE — 99214 OFFICE O/P EST MOD 30 MIN: CPT

## 2021-01-28 PROCEDURE — 73030 X-RAY EXAM OF SHOULDER: CPT | Mod: RT

## 2021-01-28 PROCEDURE — 99072 ADDL SUPL MATRL&STAF TM PHE: CPT

## 2021-01-28 NOTE — REASON FOR VISIT
[Follow-Up Visit] : a follow-up visit for [FreeTextEntry2] : neck and lower back pain, right shoulder pain

## 2021-01-28 NOTE — DISCUSSION/SUMMARY
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  She has tendinitis of the right shoulder.  I have discussed the pathology, natural history and treatment options with her.  I have recommended a course of physical therapy but she declines.  I have offered her prescription anti-inflammatory but she has declined.  She will take her supplements and apply patches locally.  If she wishes further treatment she will contact us.\par She has had several falls recently and has not been evaluated by her primary care physician.  I have advised her that it is essential that she have a medical evaluation because of her falling episodes.

## 2021-01-28 NOTE — PHYSICAL EXAM
[DP] : dorsalis pedis 2+ and symmetric bilaterally [PT] : posterior tibial 2+ and symmetric bilaterally [Rad] : radial 2+ and symmetric bilaterally [Normal] : Alert and in no acute distress [Poor Appearance] : well-appearing [Acute Distress] : not in acute distress [Obese] : not obese [de-identified] : The patient has no respiratory distress. Mood and affect are normal. The patient is alert and oriented to person, place and time.\par Examination of the cervical spine demonstrates no deformity. There is tenderness of the right paracervical muscles and the right trapezius muscle. There is mild muscle spasm. Cervical spine range of motion is right lateral rotation of 40°, left lateral rotation of 40°, extension of 45° and flexion of 45°. Upper extremity neurologic exam is intact with regard to sensation. Motor function is 5 over 5 in all groups in the upper extremities. Deep tendon reflexes are 2+ and equal at the biceps, triceps and brachial radialis.\par Examination of the right shoulder demonstrates no deformity. The skin is intact. There is no erythema. There is tenderness anteriorly. Impingement sign is positive There is no instability. Drop arm test is negative. Empty can test is negative. Liftoff test is negative. Blaine test is negative.  She has elevation of 125 degrees, external rotation of 40 degrees and internal rotation to the mid lumbar level.  The right shoulder has motion of 140, 50 and upper lumbar level respectively.  The elbows are stable and nontender.  There is no lymphedema.\par Examination of the lumbar spine demonstrates tenderness to the right of the midline. There is mild muscle spasm. There is no deformity. Lumbar flexion is 90°, right lateral flexion 10° and left lateral flexion 10°. Straight leg raise test is negative. Lower extremity neurologic exam is intact with regard to sensation, motor function and deep tendon reflexes.\par Trendelenburg is negative.  There is no pain with rotation of the hips.  There is no pain with motion of the knees.  The skin is intact.  There is no lymphedema. [de-identified] : AP and lateral x-rays of the lumbar spine taken today demonstrate multilevel degenerative changes.  There are no fractures or dislocations.  AP, transscapular and axillary x-rays of the right shoulder taken today demonstrate no fracture or dislocation.\par X-rays of the cervical spine taken in the past demonstrate multilevel degenerative changes.  They are reviewed today.

## 2021-01-28 NOTE — HISTORY OF PRESENT ILLNESS
[de-identified] : The patient presents today stating she has had two falls recently, does not feel steady on her feet and has aches and pains throughout her body. She fell in Oct 2020 sustaining a right distal radius fracture treated with ORIF with Dr. Vallejo. She fell December 2020 hitting her head, sustaining a laceration requiring staples. She has some pain in her neck and feels a popping sensation when she turns her head. She has chronic lower back pain, treated in the past with PT and chiropractic care. She has mild pain in the right shoulder when reaching overhead. She occasionally takes Tylenol. She has a history of GI bleed and avoids NSAIDs.

## 2021-04-05 ENCOUNTER — APPOINTMENT (OUTPATIENT)
Dept: ORTHOPEDIC SURGERY | Facility: CLINIC | Age: 86
End: 2021-04-05
Payer: MEDICARE

## 2021-04-05 PROCEDURE — 99212 OFFICE O/P EST SF 10 MIN: CPT

## 2021-04-05 PROCEDURE — 99072 ADDL SUPL MATRL&STAF TM PHE: CPT

## 2021-04-06 NOTE — END OF VISIT
[FreeTextEntry3] : All medical record entries made by the Scribe were at my,  Dr. Vinay Vallejo MD., direction and personally dictated by me on 04/05/2021. I have personally reviewed the chart and agree that the record accurately reflects my personal performance of the history, physical exam, assessment and plan.\par \par

## 2021-04-06 NOTE — HISTORY OF PRESENT ILLNESS
[FreeTextEntry1] : DEEP HAYWOOD is a 85 year female presents for follow-up evaluation after undergoing ORIF right distal radius, right carpal tunnel release at [facility].  The surgery was performed on 10/16/2020. She is also complains of pain radiating from her right palm to the index fingers. She states she has slight numbness in her fingertips.

## 2021-04-06 NOTE — PHYSICAL EXAM
[de-identified] : Patient is WDWN, alert, and in no acute distress. Breathing is unlabored. She is grossly oriented to person, place, \par and time.\par \par Right wrist: \par Wrist Flex 30, ext 30\par Digits FROM\par Sensation is normal.  [de-identified] : 1/4/21 -  AP, lateral and oblique views of the right wrist were obtained and revealed a well healed distal radius fracture stabilized with a Synthes locked volar plate. Hardware is in place. \par

## 2021-04-06 NOTE — DISCUSSION/SUMMARY
[FreeTextEntry1] : The underlying pathophysiology was reviewed with the patient. XR films were reviewed with the patient. Discussed at length the nature of the patient’s condition. \par \par No restrictions.\par Patient may resume all activities as tolerated.\par \par Patient can continue activities as tolerated. All questions answered, understanding verbalized. Patient in agreement with plan of care.\par

## 2021-04-06 NOTE — ADDENDUM
[FreeTextEntry1] : I, Chel Perera wrote this note acting as a scribe for Dr. Vinay Vallejo on Apr 05, 2021.\par \par

## 2021-04-21 ENCOUNTER — APPOINTMENT (OUTPATIENT)
Dept: CT IMAGING | Facility: CLINIC | Age: 86
End: 2021-04-21
Payer: MEDICARE

## 2021-04-21 ENCOUNTER — OUTPATIENT (OUTPATIENT)
Dept: OUTPATIENT SERVICES | Facility: HOSPITAL | Age: 86
LOS: 1 days | End: 2021-04-21
Payer: MEDICARE

## 2021-04-21 DIAGNOSIS — Z00.8 ENCOUNTER FOR OTHER GENERAL EXAMINATION: ICD-10-CM

## 2021-04-21 PROCEDURE — 70486 CT MAXILLOFACIAL W/O DYE: CPT

## 2021-04-21 PROCEDURE — 70486 CT MAXILLOFACIAL W/O DYE: CPT | Mod: 26

## 2021-08-09 ENCOUNTER — APPOINTMENT (OUTPATIENT)
Dept: ORTHOPEDIC SURGERY | Facility: CLINIC | Age: 86
End: 2021-08-09
Payer: MEDICARE

## 2021-08-09 PROCEDURE — 99213 OFFICE O/P EST LOW 20 MIN: CPT

## 2021-08-09 PROCEDURE — 73110 X-RAY EXAM OF WRIST: CPT | Mod: RT

## 2021-08-09 NOTE — END OF VISIT
[FreeTextEntry3] : All medical record entries made by the Scribe were at my,  Dr. Vinay Vallejo MD., direction and personally dictated by me on 08/09/2021. I have personally reviewed the chart and agree that the record accurately reflects my personal performance of the history, physical exam, assessment and plan.

## 2021-08-09 NOTE — PHYSICAL EXAM
[de-identified] : Patient is WDWN, alert, and in no acute distress. Breathing is unlabored. She is grossly oriented to person, place, and time.\par \par Right wrist: \par Wrist Flex 30, ext 30\par Digits FROM\par Sensation is slightly diminished to the tips of the thumb and index fingers.  [de-identified] : AP, lateral and oblique views of the right wrist were obtained and revealed a well healed distal radius fracture stabilized with a Synthes locked volar plate. Hardware is in place.

## 2021-08-09 NOTE — ADDENDUM
[FreeTextEntry1] : I, Chel Perera wrote this note acting as a scribe for Dr. Vinay Vallejo on Aug 09, 2021.\par \par

## 2021-08-09 NOTE — DISCUSSION/SUMMARY
[FreeTextEntry1] : The underlying pathophysiology was reviewed with the patient. XR films were reviewed with the patient. Discussed at length the nature of the patient’s condition.\par \par I discussed the option of hardware removal as the patient has expressed today that the wrist has become painful and uncomfortable over the last few months. I recommended she proceed with removal of hardware due to the discomfort. She will call our surgical schedular at her earliest convenience after further considering her options. \par \par Patient can continue activities as tolerated. All questions answered, understanding verbalized. Patient in agreement with plan of care. Follow up in

## 2021-08-09 NOTE — HISTORY OF PRESENT ILLNESS
[FreeTextEntry1] : DEEP HAYWOOD is a 85 year female presents for follow-up evaluation after undergoing ORIF right distal radius, right carpal tunnel release at [facility]. The surgery was performed on 10/16/2020. Patient presents on 8/9/21, stating that the wrist overall does not feel great. She complains of pain through the volar portion of the wrist and reports it is very uncomfortable. She additionally complains of mild numbness and tingling to the tips of the thumb and index fingers. \par She additionally complains of numbness and tingling along the median nerve distribution of the left hand.

## 2021-09-27 ENCOUNTER — OUTPATIENT (OUTPATIENT)
Dept: OUTPATIENT SERVICES | Facility: HOSPITAL | Age: 86
LOS: 1 days | End: 2021-09-27
Payer: MEDICARE

## 2021-09-27 VITALS
WEIGHT: 156.53 LBS | OXYGEN SATURATION: 96 % | TEMPERATURE: 97 F | SYSTOLIC BLOOD PRESSURE: 136 MMHG | HEIGHT: 62 IN | RESPIRATION RATE: 16 BRPM | HEART RATE: 52 BPM | DIASTOLIC BLOOD PRESSURE: 58 MMHG

## 2021-09-27 DIAGNOSIS — Z01.818 ENCOUNTER FOR OTHER PREPROCEDURAL EXAMINATION: ICD-10-CM

## 2021-09-27 DIAGNOSIS — Z98.890 OTHER SPECIFIED POSTPROCEDURAL STATES: Chronic | ICD-10-CM

## 2021-09-27 DIAGNOSIS — T84.84XA PAIN DUE TO INTERNAL ORTHOPEDIC PROSTHETIC DEVICES, IMPLANTS AND GRAFTS, INITIAL ENCOUNTER: ICD-10-CM

## 2021-09-27 LAB
ANION GAP SERPL CALC-SCNC: 7 MMOL/L — SIGNIFICANT CHANGE UP (ref 5–17)
BUN SERPL-MCNC: 24 MG/DL — HIGH (ref 7–23)
CALCIUM SERPL-MCNC: 9.1 MG/DL — SIGNIFICANT CHANGE UP (ref 8.4–10.5)
CHLORIDE SERPL-SCNC: 106 MMOL/L — SIGNIFICANT CHANGE UP (ref 96–108)
CO2 SERPL-SCNC: 28 MMOL/L — SIGNIFICANT CHANGE UP (ref 22–31)
CREAT SERPL-MCNC: 0.66 MG/DL — SIGNIFICANT CHANGE UP (ref 0.5–1.3)
GLUCOSE SERPL-MCNC: 105 MG/DL — HIGH (ref 70–99)
HCT VFR BLD CALC: 42.5 % — SIGNIFICANT CHANGE UP (ref 34.5–45)
HGB BLD-MCNC: 14.2 G/DL — SIGNIFICANT CHANGE UP (ref 11.5–15.5)
MCHC RBC-ENTMCNC: 31.4 PG — SIGNIFICANT CHANGE UP (ref 27–34)
MCHC RBC-ENTMCNC: 33.4 GM/DL — SIGNIFICANT CHANGE UP (ref 32–36)
MCV RBC AUTO: 94 FL — SIGNIFICANT CHANGE UP (ref 80–100)
NRBC # BLD: 0 /100 WBCS — SIGNIFICANT CHANGE UP (ref 0–0)
PLATELET # BLD AUTO: 251 K/UL — SIGNIFICANT CHANGE UP (ref 150–400)
POTASSIUM SERPL-MCNC: 4.3 MMOL/L — SIGNIFICANT CHANGE UP (ref 3.5–5.3)
POTASSIUM SERPL-SCNC: 4.3 MMOL/L — SIGNIFICANT CHANGE UP (ref 3.5–5.3)
RBC # BLD: 4.52 M/UL — SIGNIFICANT CHANGE UP (ref 3.8–5.2)
RBC # FLD: 13 % — SIGNIFICANT CHANGE UP (ref 10.3–14.5)
SODIUM SERPL-SCNC: 141 MMOL/L — SIGNIFICANT CHANGE UP (ref 135–145)
WBC # BLD: 5.74 K/UL — SIGNIFICANT CHANGE UP (ref 3.8–10.5)
WBC # FLD AUTO: 5.74 K/UL — SIGNIFICANT CHANGE UP (ref 3.8–10.5)

## 2021-09-27 PROCEDURE — 93010 ELECTROCARDIOGRAM REPORT: CPT

## 2021-09-27 PROCEDURE — 93005 ELECTROCARDIOGRAM TRACING: CPT

## 2021-09-27 PROCEDURE — G0463: CPT

## 2021-09-27 PROCEDURE — 85027 COMPLETE CBC AUTOMATED: CPT

## 2021-09-27 PROCEDURE — 36415 COLL VENOUS BLD VENIPUNCTURE: CPT

## 2021-09-27 PROCEDURE — 80048 BASIC METABOLIC PNL TOTAL CA: CPT

## 2021-09-27 RX ORDER — POTASSIUM CITRATE MONOHYDRATE 100 %
99 POWDER (GRAM) MISCELLANEOUS
Qty: 0 | Refills: 0 | DISCHARGE

## 2021-09-27 RX ORDER — MISOPROSTOL 200 UG/1
0 TABLET ORAL
Qty: 0 | Refills: 7 | DISCHARGE

## 2021-09-27 RX ORDER — CHOLECALCIFEROL (VITAMIN D3) 125 MCG
1 CAPSULE ORAL
Qty: 0 | Refills: 0 | DISCHARGE

## 2021-09-27 RX ORDER — OMEGA-3 ACID ETHYL ESTERS 1 G
3 CAPSULE ORAL
Qty: 0 | Refills: 0 | DISCHARGE

## 2021-09-27 RX ORDER — PROPRANOLOL HCL 160 MG
0 CAPSULE, EXTENDED RELEASE 24HR ORAL
Qty: 0 | Refills: 3 | DISCHARGE

## 2021-09-27 RX ORDER — LEVOTHYROXINE SODIUM 125 MCG
1 TABLET ORAL
Qty: 0 | Refills: 0 | DISCHARGE

## 2021-09-27 RX ORDER — ZINC SULFATE TAB 220 MG (50 MG ZINC EQUIVALENT) 220 (50 ZN) MG
1 TAB ORAL
Qty: 0 | Refills: 0 | DISCHARGE

## 2021-09-27 NOTE — H&P PST ADULT - NSICDXFAMILYHX_GEN_ALL_CORE_FT
FAMILY HISTORY:  Father  Still living? No  FH: coronary artery disease, Age at diagnosis: Age Unknown

## 2021-09-27 NOTE — H&P PST ADULT - PRO INTERPRETER NEED 2
[de-identified] : 53 year old male here to establish care and for hospital follow up.\par The patients complete medical, family and social history was documented and reviewed with the patient.  The patients medications were identified and also reviewed with the patient as well as any allergies to any medications. All active and previous medical problems were identified and discussed with the patient. \par \par patient went to the ER for rapid heart beat and shortness of breath, was diagnosed with bilateral pneumonia\par 
English

## 2021-09-27 NOTE — H&P PST ADULT - NSICDXPASTSURGICALHX_GEN_ALL_CORE_FT
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

## 2021-09-27 NOTE — H&P PST ADULT - NSICDXPASTMEDICALHX_GEN_ALL_CORE_FT
PAST MEDICAL HISTORY:  AVM (arteriovenous malformation) small intestinal    H/O basal cell carcinoma excision nose    Hypothyroidism     IBS (Irritable Bowel Syndrome)     Proctitis hx asymptomatic    Renal stone asymptomatic    Sinus infection     Tremors of nervous system left hand only     PAST MEDICAL HISTORY:  AVM (arteriovenous malformation) small intestinal    Closed fracture of left elbow     COVID-19 vaccine series completed     Glaucoma right eye    H/O basal cell carcinoma excision nose    Hypothyroidism     IBS (Irritable Bowel Syndrome)     Macular degeneration of left eye     Renal stone asymptomatic    Sinus infection     Tremors of nervous system left hand only

## 2021-09-27 NOTE — H&P PST ADULT - HISTORY OF PRESENT ILLNESS
85 yo female reports  85 yo female reports pain in right radial area since placement of hardware in October 2020.

## 2021-09-27 NOTE — H&P PST ADULT - MUSCULOSKELETAL
right hand/no joint swelling/no calf tenderness/decreased ROM due to pain/diminished strength details… detailed exam

## 2021-10-13 ENCOUNTER — OUTPATIENT (OUTPATIENT)
Dept: OUTPATIENT SERVICES | Facility: HOSPITAL | Age: 86
LOS: 1 days | End: 2021-10-13
Payer: MEDICARE

## 2021-10-13 DIAGNOSIS — Z20.828 CONTACT WITH AND (SUSPECTED) EXPOSURE TO OTHER VIRAL COMMUNICABLE DISEASES: ICD-10-CM

## 2021-10-13 DIAGNOSIS — T84.84XA PAIN DUE TO INTERNAL ORTHOPEDIC PROSTHETIC DEVICES, IMPLANTS AND GRAFTS, INITIAL ENCOUNTER: ICD-10-CM

## 2021-10-13 DIAGNOSIS — Z98.890 OTHER SPECIFIED POSTPROCEDURAL STATES: Chronic | ICD-10-CM

## 2021-10-13 DIAGNOSIS — Z01.818 ENCOUNTER FOR OTHER PREPROCEDURAL EXAMINATION: ICD-10-CM

## 2021-10-13 LAB — SARS-COV-2 RNA SPEC QL NAA+PROBE: SIGNIFICANT CHANGE UP

## 2021-10-13 PROCEDURE — U0003: CPT

## 2021-10-13 PROCEDURE — U0005: CPT

## 2021-10-14 ENCOUNTER — TRANSCRIPTION ENCOUNTER (OUTPATIENT)
Age: 86
End: 2021-10-14

## 2021-10-14 NOTE — ASU PATIENT PROFILE, ADULT - NSICDXPASTSURGICALHX_GEN_ALL_CORE_FT
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

## 2021-10-14 NOTE — ASU PATIENT PROFILE, ADULT - NSICDXPASTMEDICALHX_GEN_ALL_CORE_FT
PAST MEDICAL HISTORY:  AVM (arteriovenous malformation) small intestinal    Closed fracture of left elbow     COVID-19 vaccine series completed     Glaucoma right eye    H/O basal cell carcinoma excision nose    Hypothyroidism     IBS (Irritable Bowel Syndrome)     Macular degeneration of left eye     Renal stone asymptomatic    Sinus infection     Tremors of nervous system left hand only

## 2021-10-15 ENCOUNTER — OUTPATIENT (OUTPATIENT)
Dept: OUTPATIENT SERVICES | Facility: HOSPITAL | Age: 86
LOS: 1 days | End: 2021-10-15
Payer: MEDICARE

## 2021-10-15 ENCOUNTER — APPOINTMENT (OUTPATIENT)
Dept: ORTHOPEDIC SURGERY | Facility: HOSPITAL | Age: 86
End: 2021-10-15

## 2021-10-15 ENCOUNTER — RESULT REVIEW (OUTPATIENT)
Age: 86
End: 2021-10-15

## 2021-10-15 VITALS
OXYGEN SATURATION: 97 % | HEART RATE: 51 BPM | DIASTOLIC BLOOD PRESSURE: 49 MMHG | RESPIRATION RATE: 13 BRPM | SYSTOLIC BLOOD PRESSURE: 127 MMHG

## 2021-10-15 VITALS
HEIGHT: 62 IN | TEMPERATURE: 98 F | OXYGEN SATURATION: 95 % | WEIGHT: 156.31 LBS | SYSTOLIC BLOOD PRESSURE: 142 MMHG | RESPIRATION RATE: 16 BRPM | DIASTOLIC BLOOD PRESSURE: 46 MMHG | HEART RATE: 65 BPM

## 2021-10-15 DIAGNOSIS — T84.84XA PAIN DUE TO INTERNAL ORTHOPEDIC PROSTHETIC DEVICES, IMPLANTS AND GRAFTS, INITIAL ENCOUNTER: ICD-10-CM

## 2021-10-15 DIAGNOSIS — Z01.818 ENCOUNTER FOR OTHER PREPROCEDURAL EXAMINATION: ICD-10-CM

## 2021-10-15 DIAGNOSIS — Z98.890 OTHER SPECIFIED POSTPROCEDURAL STATES: Chronic | ICD-10-CM

## 2021-10-15 PROCEDURE — 76000 FLUOROSCOPY <1 HR PHYS/QHP: CPT

## 2021-10-15 PROCEDURE — 20680 REMOVAL OF IMPLANT DEEP: CPT

## 2021-10-15 PROCEDURE — 20680 REMOVAL OF IMPLANT DEEP: CPT | Mod: RT

## 2021-10-15 RX ORDER — APREPITANT 80 MG/1
40 CAPSULE ORAL ONCE
Refills: 0 | Status: COMPLETED | OUTPATIENT
Start: 2021-10-15 | End: 2021-10-15

## 2021-10-15 RX ORDER — SODIUM CHLORIDE 9 MG/ML
1000 INJECTION, SOLUTION INTRAVENOUS
Refills: 0 | Status: DISCONTINUED | OUTPATIENT
Start: 2021-10-15 | End: 2021-10-15

## 2021-10-15 RX ORDER — IBUPROFEN 200 MG
1 TABLET ORAL
Qty: 30 | Refills: 0
Start: 2021-10-15

## 2021-10-15 RX ORDER — CHLORHEXIDINE GLUCONATE 213 G/1000ML
1 SOLUTION TOPICAL ONCE
Refills: 0 | Status: COMPLETED | OUTPATIENT
Start: 2021-10-15 | End: 2021-10-15

## 2021-10-15 RX ORDER — HYDROMORPHONE HYDROCHLORIDE 2 MG/ML
0.5 INJECTION INTRAMUSCULAR; INTRAVENOUS; SUBCUTANEOUS
Refills: 0 | Status: DISCONTINUED | OUTPATIENT
Start: 2021-10-15 | End: 2021-10-15

## 2021-10-15 RX ORDER — ONDANSETRON 8 MG/1
4 TABLET, FILM COATED ORAL ONCE
Refills: 0 | Status: DISCONTINUED | OUTPATIENT
Start: 2021-10-15 | End: 2021-10-15

## 2021-10-15 RX ORDER — OXYCODONE HYDROCHLORIDE 5 MG/1
5 TABLET ORAL ONCE
Refills: 0 | Status: DISCONTINUED | OUTPATIENT
Start: 2021-10-15 | End: 2021-10-15

## 2021-10-15 RX ORDER — PROPRANOLOL HCL 160 MG
1 CAPSULE, EXTENDED RELEASE 24HR ORAL
Qty: 0 | Refills: 3 | DISCHARGE

## 2021-10-15 RX ORDER — CEFAZOLIN SODIUM 1 G
2000 VIAL (EA) INJECTION ONCE
Refills: 0 | Status: COMPLETED | OUTPATIENT
Start: 2021-10-15 | End: 2021-10-15

## 2021-10-15 RX ADMIN — HYDROMORPHONE HYDROCHLORIDE 0.5 MILLIGRAM(S): 2 INJECTION INTRAMUSCULAR; INTRAVENOUS; SUBCUTANEOUS at 13:30

## 2021-10-15 RX ADMIN — APREPITANT 40 MILLIGRAM(S): 80 CAPSULE ORAL at 11:10

## 2021-10-15 RX ADMIN — HYDROMORPHONE HYDROCHLORIDE 0.5 MILLIGRAM(S): 2 INJECTION INTRAMUSCULAR; INTRAVENOUS; SUBCUTANEOUS at 13:15

## 2021-10-15 RX ADMIN — CHLORHEXIDINE GLUCONATE 1 APPLICATION(S): 213 SOLUTION TOPICAL at 11:10

## 2021-10-15 RX ADMIN — SODIUM CHLORIDE 75 MILLILITER(S): 9 INJECTION, SOLUTION INTRAVENOUS at 12:45

## 2021-10-15 NOTE — ASU DISCHARGE PLAN (ADULT/PEDIATRIC) - CARE PROVIDER_API CALL
Vinay Vallejo)  Orthopaedic Surgery  [address]  [place] [address]  Phone: [phone]  Fax: [phone]  Follow Up Time:

## 2021-10-15 NOTE — ASU DISCHARGE PLAN (ADULT/PEDIATRIC) - ASU DC SPECIAL INSTRUCTIONSFT
Elevate Right  arm in sling daily when up & walking.  Elevate the  hand/arm above heart level on pillow/blankets when lying down.  Pad the neck strap with athletic sock/collared shirt.  Apply ice packs to top of  Right wrist for 20 min on and off  Keep bandage clean, dry , & intact daily.  May open & close the fingers of the operated arm every hour for exercise.  Call the Dr.  for fever, severe pain, fall or hand injury.  Call for an appointment for office visit  in 10-14 days.

## 2021-10-18 PROBLEM — S42.402A UNSPECIFIED FRACTURE OF LOWER END OF LEFT HUMERUS, INITIAL ENCOUNTER FOR CLOSED FRACTURE: Chronic | Status: ACTIVE | Noted: 2021-09-27

## 2021-10-18 PROBLEM — H35.30 UNSPECIFIED MACULAR DEGENERATION: Chronic | Status: ACTIVE | Noted: 2021-09-27

## 2021-10-18 PROBLEM — Z98.890 OTHER SPECIFIED POSTPROCEDURAL STATES: Chronic | Status: ACTIVE | Noted: 2020-10-13

## 2021-10-18 PROBLEM — J32.9 CHRONIC SINUSITIS, UNSPECIFIED: Chronic | Status: ACTIVE | Noted: 2021-09-27

## 2021-10-18 PROBLEM — Z92.29 PERSONAL HISTORY OF OTHER DRUG THERAPY: Chronic | Status: ACTIVE | Noted: 2021-09-27

## 2021-10-18 PROBLEM — H40.9 UNSPECIFIED GLAUCOMA: Chronic | Status: ACTIVE | Noted: 2021-09-27

## 2021-10-19 ENCOUNTER — EMERGENCY (EMERGENCY)
Facility: HOSPITAL | Age: 86
LOS: 1 days | Discharge: HOME | End: 2021-10-19
Attending: EMERGENCY MEDICINE
Payer: MEDICARE

## 2021-10-19 VITALS
WEIGHT: 156.09 LBS | SYSTOLIC BLOOD PRESSURE: 150 MMHG | TEMPERATURE: 98 F | HEIGHT: 62 IN | HEART RATE: 61 BPM | RESPIRATION RATE: 16 BRPM | OXYGEN SATURATION: 93 % | DIASTOLIC BLOOD PRESSURE: 68 MMHG

## 2021-10-19 VITALS
RESPIRATION RATE: 16 BRPM | DIASTOLIC BLOOD PRESSURE: 58 MMHG | OXYGEN SATURATION: 100 % | SYSTOLIC BLOOD PRESSURE: 116 MMHG | TEMPERATURE: 98 F | HEART RATE: 58 BPM

## 2021-10-19 DIAGNOSIS — Z98.890 OTHER SPECIFIED POSTPROCEDURAL STATES: Chronic | ICD-10-CM

## 2021-10-19 LAB
ALBUMIN SERPL ELPH-MCNC: 4.3 G/DL — SIGNIFICANT CHANGE UP (ref 3.3–5)
ALP SERPL-CCNC: 92 U/L — SIGNIFICANT CHANGE UP (ref 40–120)
ALT FLD-CCNC: 16 U/L — SIGNIFICANT CHANGE UP (ref 10–45)
ANION GAP SERPL CALC-SCNC: 10 MMOL/L — SIGNIFICANT CHANGE UP (ref 5–17)
APPEARANCE UR: CLEAR — SIGNIFICANT CHANGE UP
APTT BLD: 35.8 SEC — HIGH (ref 27.5–35.5)
AST SERPL-CCNC: 20 U/L — SIGNIFICANT CHANGE UP (ref 10–40)
BASE EXCESS BLDV CALC-SCNC: 4.4 MMOL/L — HIGH (ref -2–2)
BASOPHILS # BLD AUTO: 0.03 K/UL — SIGNIFICANT CHANGE UP (ref 0–0.2)
BASOPHILS NFR BLD AUTO: 0.5 % — SIGNIFICANT CHANGE UP (ref 0–2)
BILIRUB SERPL-MCNC: 0.5 MG/DL — SIGNIFICANT CHANGE UP (ref 0.2–1.2)
BILIRUB UR-MCNC: NEGATIVE — SIGNIFICANT CHANGE UP
BUN SERPL-MCNC: 22 MG/DL — SIGNIFICANT CHANGE UP (ref 7–23)
CALCIUM SERPL-MCNC: 10 MG/DL — SIGNIFICANT CHANGE UP (ref 8.4–10.5)
CHLORIDE SERPL-SCNC: 106 MMOL/L — SIGNIFICANT CHANGE UP (ref 96–108)
CO2 BLDV-SCNC: 32 MMOL/L — HIGH (ref 22–26)
CO2 SERPL-SCNC: 25 MMOL/L — SIGNIFICANT CHANGE UP (ref 22–31)
COLOR SPEC: SIGNIFICANT CHANGE UP
CREAT SERPL-MCNC: 0.73 MG/DL — SIGNIFICANT CHANGE UP (ref 0.5–1.3)
DIFF PNL FLD: NEGATIVE — SIGNIFICANT CHANGE UP
EOSINOPHIL # BLD AUTO: 0.17 K/UL — SIGNIFICANT CHANGE UP (ref 0–0.5)
EOSINOPHIL NFR BLD AUTO: 2.9 % — SIGNIFICANT CHANGE UP (ref 0–6)
GAS PNL BLDV: SIGNIFICANT CHANGE UP
GLUCOSE SERPL-MCNC: 103 MG/DL — HIGH (ref 70–99)
GLUCOSE UR QL: NEGATIVE — SIGNIFICANT CHANGE UP
HCO3 BLDV-SCNC: 31 MMOL/L — HIGH (ref 22–29)
HCT VFR BLD CALC: 43.5 % — SIGNIFICANT CHANGE UP (ref 34.5–45)
HGB BLD-MCNC: 13.8 G/DL — SIGNIFICANT CHANGE UP (ref 11.5–15.5)
IMM GRANULOCYTES NFR BLD AUTO: 0.3 % — SIGNIFICANT CHANGE UP (ref 0–1.5)
INR BLD: 1.11 RATIO — SIGNIFICANT CHANGE UP (ref 0.88–1.16)
KETONES UR-MCNC: ABNORMAL
LEUKOCYTE ESTERASE UR-ACNC: NEGATIVE — SIGNIFICANT CHANGE UP
LYMPHOCYTES # BLD AUTO: 1.14 K/UL — SIGNIFICANT CHANGE UP (ref 1–3.3)
LYMPHOCYTES # BLD AUTO: 19.4 % — SIGNIFICANT CHANGE UP (ref 13–44)
MCHC RBC-ENTMCNC: 29.9 PG — SIGNIFICANT CHANGE UP (ref 27–34)
MCHC RBC-ENTMCNC: 31.7 GM/DL — LOW (ref 32–36)
MCV RBC AUTO: 94.4 FL — SIGNIFICANT CHANGE UP (ref 80–100)
MONOCYTES # BLD AUTO: 0.61 K/UL — SIGNIFICANT CHANGE UP (ref 0–0.9)
MONOCYTES NFR BLD AUTO: 10.4 % — SIGNIFICANT CHANGE UP (ref 2–14)
NEUTROPHILS # BLD AUTO: 3.91 K/UL — SIGNIFICANT CHANGE UP (ref 1.8–7.4)
NEUTROPHILS NFR BLD AUTO: 66.5 % — SIGNIFICANT CHANGE UP (ref 43–77)
NITRITE UR-MCNC: NEGATIVE — SIGNIFICANT CHANGE UP
NRBC # BLD: 0 /100 WBCS — SIGNIFICANT CHANGE UP (ref 0–0)
PCO2 BLDV: 52 MMHG — HIGH (ref 39–42)
PH BLDV: 7.38 — SIGNIFICANT CHANGE UP (ref 7.32–7.43)
PH UR: 6.5 — SIGNIFICANT CHANGE UP (ref 5–8)
PLATELET # BLD AUTO: 278 K/UL — SIGNIFICANT CHANGE UP (ref 150–400)
PO2 BLDV: 20 MMHG — LOW (ref 25–45)
POTASSIUM SERPL-MCNC: 4 MMOL/L — SIGNIFICANT CHANGE UP (ref 3.5–5.3)
POTASSIUM SERPL-SCNC: 4 MMOL/L — SIGNIFICANT CHANGE UP (ref 3.5–5.3)
PROT SERPL-MCNC: 6.8 G/DL — SIGNIFICANT CHANGE UP (ref 6–8.3)
PROT UR-MCNC: SIGNIFICANT CHANGE UP
PROTHROM AB SERPL-ACNC: 13.3 SEC — SIGNIFICANT CHANGE UP (ref 10.6–13.6)
RBC # BLD: 4.61 M/UL — SIGNIFICANT CHANGE UP (ref 3.8–5.2)
RBC # FLD: 13 % — SIGNIFICANT CHANGE UP (ref 10.3–14.5)
SAO2 % BLDV: 30.5 % — LOW (ref 67–88)
SARS-COV-2 RNA SPEC QL NAA+PROBE: SIGNIFICANT CHANGE UP
SODIUM SERPL-SCNC: 141 MMOL/L — SIGNIFICANT CHANGE UP (ref 135–145)
SP GR SPEC: >1.05 (ref 1.01–1.02)
TROPONIN T, HIGH SENSITIVITY RESULT: 8 NG/L — SIGNIFICANT CHANGE UP (ref 0–51)
TROPONIN T, HIGH SENSITIVITY RESULT: 8 NG/L — SIGNIFICANT CHANGE UP (ref 0–51)
UROBILINOGEN FLD QL: NEGATIVE — SIGNIFICANT CHANGE UP
WBC # BLD: 5.88 K/UL — SIGNIFICANT CHANGE UP (ref 3.8–10.5)
WBC # FLD AUTO: 5.88 K/UL — SIGNIFICANT CHANGE UP (ref 3.8–10.5)

## 2021-10-19 PROCEDURE — 81003 URINALYSIS AUTO W/O SCOPE: CPT

## 2021-10-19 PROCEDURE — 70450 CT HEAD/BRAIN W/O DYE: CPT | Mod: 26,59,MA

## 2021-10-19 PROCEDURE — 85610 PROTHROMBIN TIME: CPT

## 2021-10-19 PROCEDURE — 82803 BLOOD GASES ANY COMBINATION: CPT

## 2021-10-19 PROCEDURE — 87086 URINE CULTURE/COLONY COUNT: CPT

## 2021-10-19 PROCEDURE — 85025 COMPLETE CBC W/AUTO DIFF WBC: CPT

## 2021-10-19 PROCEDURE — 70450 CT HEAD/BRAIN W/O DYE: CPT | Mod: MA

## 2021-10-19 PROCEDURE — 36415 COLL VENOUS BLD VENIPUNCTURE: CPT

## 2021-10-19 PROCEDURE — 70498 CT ANGIOGRAPHY NECK: CPT | Mod: 26,MA

## 2021-10-19 PROCEDURE — 70496 CT ANGIOGRAPHY HEAD: CPT | Mod: MA

## 2021-10-19 PROCEDURE — 84484 ASSAY OF TROPONIN QUANT: CPT

## 2021-10-19 PROCEDURE — 99284 EMERGENCY DEPT VISIT MOD MDM: CPT | Mod: 25

## 2021-10-19 PROCEDURE — 80053 COMPREHEN METABOLIC PANEL: CPT

## 2021-10-19 PROCEDURE — 70496 CT ANGIOGRAPHY HEAD: CPT | Mod: 26,MA

## 2021-10-19 PROCEDURE — 87635 SARS-COV-2 COVID-19 AMP PRB: CPT

## 2021-10-19 PROCEDURE — 82962 GLUCOSE BLOOD TEST: CPT

## 2021-10-19 PROCEDURE — 93005 ELECTROCARDIOGRAM TRACING: CPT

## 2021-10-19 PROCEDURE — 70498 CT ANGIOGRAPHY NECK: CPT | Mod: MA

## 2021-10-19 PROCEDURE — 93010 ELECTROCARDIOGRAM REPORT: CPT

## 2021-10-19 PROCEDURE — 85730 THROMBOPLASTIN TIME PARTIAL: CPT

## 2021-10-19 PROCEDURE — 99285 EMERGENCY DEPT VISIT HI MDM: CPT | Mod: CS

## 2021-10-19 RX ORDER — DORZOLAMIDE HYDROCHLORIDE 20 MG/ML
1 SOLUTION/ DROPS OPHTHALMIC ONCE
Refills: 0 | Status: DISCONTINUED | OUTPATIENT
Start: 2021-10-19 | End: 2021-10-23

## 2021-10-19 RX ORDER — BRIMONIDINE TARTRATE 2 MG/MG
1 SOLUTION/ DROPS OPHTHALMIC ONCE
Refills: 0 | Status: DISCONTINUED | OUTPATIENT
Start: 2021-10-19 | End: 2021-10-23

## 2021-10-19 RX ORDER — LIDOCAINE HCL 20 MG/ML
20 VIAL (ML) INJECTION ONCE
Refills: 0 | Status: DISCONTINUED | OUTPATIENT
Start: 2021-10-19 | End: 2021-10-23

## 2021-10-19 RX ORDER — LATANOPROST 0.05 MG/ML
1 SOLUTION/ DROPS OPHTHALMIC; TOPICAL
Qty: 2.5 | Refills: 0
Start: 2021-10-19 | End: 2021-10-28

## 2021-10-19 RX ORDER — TIMOLOL 0.5 %
1 DROPS OPHTHALMIC (EYE) ONCE
Refills: 0 | Status: DISCONTINUED | OUTPATIENT
Start: 2021-10-19 | End: 2021-10-23

## 2021-10-19 RX ORDER — DORZOLAMIDE HYDROCHLORIDE TIMOLOL MALEATE 20; 5 MG/ML; MG/ML
1 SOLUTION/ DROPS OPHTHALMIC
Qty: 10 | Refills: 0
Start: 2021-10-19 | End: 2021-10-25

## 2021-10-19 NOTE — STROKE CODE NOTE - IV ALTEPLASE EXCLUSION ABS OTHER
Discussed with stroke fellow Dr Shalonda Alonso and under supervision with attending Dr Richard Libman regarding decision against candidacy for tPA/ thrombectomy

## 2021-10-19 NOTE — ED PROVIDER NOTE - CLINICAL SUMMARY MEDICAL DECISION MAKING FREE TEXT BOX
Attending MD Capellan:  86F presenting for evaluation of dizziness and gait instability x 2 days. grossly non focal neuro exam, code stroke activated by triage. CT/CTA unrevealing. Neuro recommends CDU for MR to definitively exclude cerebellar process. Patient also with left eye pain and redness, h/o macular degeneration getting injections as outpatient and recently on moxifloxacin for "eye infection". Exam here with injected left eye, mydriatic pupil that is nonreponsive. Will check IOPs, consult optho for evaluation given complex recent opthalmologic history       *The above represents an initial assessment/impression. Please refer to progress notes for potential changes in patient clinical course*

## 2021-10-19 NOTE — ED ADULT NURSE NOTE - OBJECTIVE STATEMENT
87 yo F PMH 87 yo F PMH glaucoma, avm of small intestine presents with dizziness nausea and loss of depth perception since 9am. Code stroke called, taken directly to CT, motor, strength and sensation intact in upper and lower extremities, gait normal, EOMI, see neuro flow sheet for further information. NIH 3 per neuro.  VSS.

## 2021-10-19 NOTE — CONSULT NOTE ADULT - ASSESSMENT
Assessment and Recommendations:  86y female w/ pmhx/ochx of glaucoma and macular degeneration OS s/p numerous GUILHERME injections OS by Dr. Kash Ramsey of [facility], most recently 7 days ago, presents with 4 days of eye pain and loss of vision OS. Diagnosed by Dr. Ramsey with endophthalmitis. Now s/p tap and inject at [facility] yesterday. Presents to hospital for nausea.     1. Endophthalmitis of L eye  - Visual acuity (sc): 20/25 OD, HM OS, Pupils: Round and reactive OD, pharmacologically dilated OS, Ttono: 11 OD, 38--->19 OS, Extraocular movements (EOMs): Full OU, no pain, no diplopia, Confrontational Visual Field (CVF): Full OD, unable to assess OS, Color Plates: 10/12 OD, unable to assess OS.   - Dilated exam notable for hazy vitreous OS with no view.   - B-scan showing vitritis with no obvious masses OS.   - Pt is s/p tap and inject at [facility], has appointment tomorrow. Pt instructed to attend appointment.   - Recommend continuing durezol, atropine, and moxifloxacin eyedrops prescribed by [facility],  - IOP elevated to 38 OS, likely 2/2 clogging of trabecular meshwork by inflammatory/infectious material  - IOP improved to 19 with 2 rounds of IOP-lowering drops  - Start Xalatan QHS and Cosopt BID in L eye to keep pressure in normal range.     Outpatient follow-up: Patient should follow-up with Dr. Ramsey of [facility]. She has an appointment with him tomorrow. She states she will attend.     Case seen and discussed with Dr. Pettit, chief resident.     Ariadne LIND Rai, MD  PGY-2, Ophthalmology  [phone]    Also available on Microsoft Teams.

## 2021-10-19 NOTE — ED PROVIDER NOTE - RATE
60 [General Appearance - Well Developed] : well developed [Normal Appearance] : normal appearance [Well Groomed] : well groomed [General Appearance - Well Nourished] : well nourished [No Deformities] : no deformities [General Appearance - In No Acute Distress] : no acute distress [Normal Conjunctiva] : the conjunctiva exhibited no abnormalities [Eyelids - No Xanthelasma] : the eyelids demonstrated no xanthelasmas [Normal Oral Mucosa] : normal oral mucosa [No Oral Pallor] : no oral pallor [No Oral Cyanosis] : no oral cyanosis [Normal Jugular Venous A Waves Present] : normal jugular venous A waves present [No Jugular Venous Horton A Waves] : no jugular venous horton A waves [Normal Jugular Venous V Waves Present] : normal jugular venous V waves present [Respiration, Rhythm And Depth] : normal respiratory rhythm and effort [Auscultation Breath Sounds / Voice Sounds] : lungs were clear to auscultation bilaterally [Exaggerated Use Of Accessory Muscles For Inspiration] : no accessory muscle use [Abdomen Soft] : soft [Abdomen Tenderness] : non-tender [Abdomen Mass (___ Cm)] : no abdominal mass palpated [Abnormal Walk] : normal gait [Gait - Sufficient For Exercise Testing] : the gait was sufficient for exercise testing [Nail Clubbing] : no clubbing of the fingernails [Petechial Hemorrhages (___cm)] : no petechial hemorrhages [Cyanosis, Localized] : no localized cyanosis [Skin Color & Pigmentation] : normal skin color and pigmentation [] : no rash [No Venous Stasis] : no venous stasis [Skin Lesions] : no skin lesions [No Skin Ulcers] : no skin ulcer [Affect] : the affect was normal [No Xanthoma] : no  xanthoma was observed [Oriented To Time, Place, And Person] : oriented to person, place, and time [Mood] : the mood was normal [No Anxiety] : not feeling anxious [5th Left ICS - MCL] : palpated at the 5th LICS in the midclavicular line [Normal] : normal [No Precordial Heave] : no precordial heave was noted [Apical Thrill] : no thrill palpable at the apex [Normal Rate] : normal [Heart Rate ___] : [unfilled] bpm [Normal S1] : normal S1 [Rhythm Regular] : regular [No Gallop] : no gallop heard [Normal S2] : normal S2 [S3] : no S3 [S4] : no S4 [Click] : no click [No Murmur] : no murmurs heard [Right Carotid Bruit] : no bruit heard over the right carotid [Left Carotid Bruit] : no bruit heard over the left carotid [Right Femoral Bruit] : no bruit heard over the right femoral artery [Left Femoral Bruit] : no bruit heard over the left femoral artery [2+] : right 2+ [Bruit] : no bruit heard [No Abnormalities] : the abdominal aorta was not enlarged and no bruit was heard [Rt] : no varicose veins of the right leg [No Pitting Edema] : no pitting edema present [Lt] : no varicose veins of the left leg

## 2021-10-19 NOTE — CONSULT NOTE ADULT - ASSESSMENT
Patient is a 85yo F PMHx of essential tremors on propranolol, stomach AVM, basal cell carcinoma s/p excision, glaucoma, macular degeneration, not on ac/ap who presents to ED as code stroke for dizziness and gait unsteadiness since morning. LKN 9PM 10/18/21. VS 98.1, HR61, /68, RR16, 93%O2 RA. Exam notable for R gaze horizontal nystagmus that fatigues. CBC, CMP, coags unremarkable. CT evaluation unrevealing.     LKW: 9PM 10/18/21  NIHSS:  3 (b/l hand tremors limit evaluation for ataxia, has macular degen L eye, reducing visual field)  Baseline MRS:  Not a tPA candidate due to patient being outside tPA window  Not a thrombectomy candidate due to absent large vessel occlusion and based on NIHSS    CT brain: No hydrocephalus, acute intracranial hemorrhage, mass effect, or brain edema.  CTA brain/ neck: No flow-limiting stenosis, vascular aneurysm, dissection or AVM.    Impression: Patient with persistent vertigo possibly secondary to peripheral etiologies vs medication side effect (?i.e propranolol). Would still need to consider and evaluate for posterior circulation stroke although low suspicion.    Recommendations:  [] Consider CDU placement  [] orthostatics  [] Aspirin 81mg daily for now  [] Atorvastatin 40-80 mg daily (long-term goal and titrate to LDL < 70)  [] HgbA1C, fasting lipid panel, CBC, CMP, coag panel, troponin  [] MRI brain w/o con (if not contraindicated)    [] telemetry while here to check for arrhythmia if MRI shows signs of stroke, otherwise baseline EKG,     - Tight glucose control (long-term goal HgbA1c < 6%)   - Neuro-checks with VS q4h  - Permissive HTN up to 220/120 for 24-48h from symptom onset  - Dysphagia screen passed  - DVT ppx if admitted as per primary team    Discussed with stroke fellow Dr Shalonda Alonso and under supervision of attending Dr Richard Libman regarding decision against candidacy for tPA/ thrombectomy. Will be formally staffed on morning rounds with attending. Recommendations will be complete once signed by attending. Patient is a 87yo F PMHx of essential tremors on propranolol, stomach AVM, basal cell carcinoma s/p excision, glaucoma, macular degeneration, not on ac/ap who presents to ED as code stroke for dizziness and gait unsteadiness since morning. LKN 9PM 10/18/21. VS 98.1, HR61, /68, RR16, 93%O2 RA. Exam notable for R gaze horizontal nystagmus that fatigues. CBC, CMP, coags unremarkable. CT evaluation unrevealing.  Dizziness now resolved.     LKW: 9PM 10/18/21  NIHSS:  3 (b/l hand tremors limit evaluation for ataxia, has macular degen L eye, reducing visual field)  Baseline MRS:  Not a tPA candidate due to patient being outside tPA window  Not a thrombectomy candidate due to absent large vessel occlusion and based on NIHSS    CT brain: No hydrocephalus, acute intracranial hemorrhage, mass effect, or brain edema.  CTA brain/ neck: No flow-limiting stenosis, vascular aneurysm, dissection or AVM.    Impression: Patient with persistent vertigo possibly secondary to peripheral etiologies vs medication side effect (?i.e propranolol). Would still need to consider and evaluate for posterior circulation stroke although low suspicion.    Recommendations:  [] Consider CDU placement  [] orthostatics  [] Aspirin 81mg daily for now  [] Atorvastatin 40-80 mg daily (long-term goal and titrate to LDL < 70)  [] HgbA1C, fasting lipid panel, CBC, CMP, coag panel, troponin  [] MRI brain w/o con (if not contraindicated)    [] telemetry while here to check for arrhythmia if MRI shows signs of stroke, otherwise baseline EKG,   [] If patient clinically stable, cleared for dc from neurology standpoint. Follow up with Dr. Jones within 1 week: [phone]    Discussed with stroke fellow Dr Shalonda Alonso and under supervision of attending Dr Richard Libman regarding decision against candidacy for tPA/ thrombectomy.

## 2021-10-19 NOTE — ED PROVIDER NOTE - NS ED ROS FT
General: no fever, no chills  Eyes: +left eye vision change, no eye pain  Cardiovascular: no chest pain, no edema  Respiratory: no cough, no shortness of breath  Gastrointestinal: no abdominal pain, no nausea, no vomiting, no diarrhea  Genitourinary: no dysuria, no hematuria  Musculoskeletal: no muscle pain, no joint pain  Skin: no rash, no lesions  Neuro: no numbness, no tingling, +dizziness  Psych: no depression, no anxiety

## 2021-10-19 NOTE — ED PROVIDER NOTE - PHYSICAL EXAMINATION
General: well appearing, no acute distress, AOx3  Skin: no rash, no pallor  Head: normocephalic, atraumatic  Eyes: EOMI, lt pupil fixed, dilated, conjunctival injection on lt, visual acuity - L 0/200 R- 30/20 b/l 30/20   ENMT: airway patent, no nasal discharge  Cardiovascular: normal rate, normal rhythm, S1/S2  Pulmonary: clear to auscultation bilaterally, no rales, rhonchi, or wheeze  Abdomen: soft, nontender  Musculoskeletal: moving extremities well, no deformity  Neuro: CN II-XII grossly intact, 5/5 strength b/l upper and lower extremities, tremor, steady gait, no dysmetria   Psych: normal mood, normal affect General: well appearing, no acute distress, AOx3  Skin: no rash, no pallor  Head: normocephalic, atraumatic  Eyes: EOMI, lt pupil fixed, dilated, conjunctival injection on lt, visual acuity - L 0/200 R- 30/20 b/l 30/20, IOP R - 12, L- 40   ENMT: airway patent, no nasal discharge  Cardiovascular: normal rate, normal rhythm, S1/S2  Pulmonary: clear to auscultation bilaterally, no rales, rhonchi, or wheeze  Abdomen: soft, nontender  Musculoskeletal: moving extremities well, no deformity  Neuro: CN II-XII grossly intact, 5/5 strength b/l upper and lower extremities, tremor, steady gait, no dysmetria   Psych: normal mood, normal affect

## 2021-10-19 NOTE — ED ADULT NURSE REASSESSMENT NOTE - NS ED NURSE REASSESS COMMENT FT1
Patient A&Ox4, breathing spontaneous and unlabored, neuro check completed, repeat trop completed as per MD orders, patient ambulating to restroom with steady gait noted, denies dizziness. Patient aware she is pending a urine sample. Patient's eye dilated by MD. Bed locked and in lowest position for safety

## 2021-10-19 NOTE — ED PROVIDER NOTE - OBJECTIVE STATEMENT
86 year old female with a pmhx of glaucoma, AVM in abdomen, presents to ED for evaluation of dizziness. Patient reports feeling unsteady on her feet since yesterday (unclear onset time). Reports vision change of left eye for 3 days, which she has seen ophthalmologist for and has gotten injections of. No recent fever, chills, cp, sob, cough, abd pain, numbness, tingling, dysuria, or weakness. No recent falls. No aspirin or AC use.   Ophthalmologist- Dr. Kash Ramsey [facility]

## 2021-10-19 NOTE — ED ADULT TRIAGE NOTE - CHIEF COMPLAINT QUOTE
left eye pain since friday, sudden loss of vision on saturday  unsteady on her feet dizzy and nausea since 0900 this am.

## 2021-10-19 NOTE — ED PROVIDER NOTE - CARE PLAN
Principal Discharge DX:	Dizziness   1 Principal Discharge DX:	Dizziness  Secondary Diagnosis:	Pain in eye

## 2021-10-19 NOTE — CONSULT NOTE ADULT - SUBJECTIVE AND OBJECTIVE BOX
[facility] DEPARTMENT OF OPHTHALMOLOGY - INITIAL ADULT CONSULT  ------------------------------------------------------------------------------------------------------------    HPI:  86F hx glaucoma and macular degeneration s/p numerous GUILHERME injections OS by Dr. Kash Ramsey of [facility], most recently 7 days ago, presents with 4 days of eye pain and loss of vision OS. Vision deteriorated to almost total vision loss 3 days ago. She saw Dr. Ramsey yesterday and he diagnosed her with endophthalmitis and he performed tap and inject. Pt has appointment tomorrow with Dr. Ramsey. Came to hospital today because she felt nauseous.     PAST MEDICAL & SURGICAL HISTORY:  Hypothyroidism    IBS (Irritable Bowel Syndrome)    AVM (arteriovenous malformation)  small intestinal    Renal stone  asymptomatic    Tremors of nervous system  left hand only    H/O basal cell carcinoma excision  nose    Sinus infection    Macular degeneration of left eye    Glaucoma  right eye    COVID-19 vaccine series completed    Closed fracture of left elbow    S/P appendectomy  childhood    S/P tonsillectomy  childhood    AVM (congenital arteriovenous malformation)  enteroscopy small intestines twice 2013 (twice 2012)    Prolapse of uterus  sling 2007    History of lithotripsy    History of open reduction and internal fixation (ORIF) procedure  right radius 10/20      Past Ocular History: glaucoma and macular degeneration s/p numerous GUILHERME injections OS, LPI OU, CEIOL OU in 2020  Ophthalmic Medications: Moxifloxacin, Durezol, Atropine  FAMILY HISTORY:  FH: coronary artery disease (Father)      Social History: Noncontributory    MEDICATIONS  (STANDING):  brimonidine 0.2% Ophthalmic Solution 1 Drop(s) Left EYE Once  dorzolamide 2% Ophthalmic Solution 1 Drop(s) Left EYE Once  lidocaine 1% Injectable 20 milliLiter(s) Local Injection Once  tetracaine 0.5% Solution 1 Drop(s) Both EYES Once  timolol 0.5% Solution 1 Drop(s) Left EYE Once    MEDICATIONS  (PRN):    Allergies & Intolerances:     Review of Systems:  Constitutional: No fever, chills  Eyes: + loss of vision OS, mild pain OS, no flashes, floaters, FBS, erythema, discharge, double vision, OU  Neuro: No tremors  Cardiovascular: No chest pain, palpitations  Respiratory: No SOB, no cough  GI: + nausea, no vomiting, abdominal pain  : No dysuria  Skin: no rash  Psych: no depression  Endocrine: no polyuria, polydipsia  Heme/lymph: no swelling    VITALS: T(C): 36.7 (10-19-21 @ 17:12)  T(F): 98.1 (10-19-21 @ 17:12), Max: 98.1 (10-19-21 @ 17:12)  HR: 61 (10-19-21 @ 17:12) (61 - 61)  BP: 150/68 (10-19-21 @ 17:12) (150/68 - 150/68)  RR:  (16 - 16)  SpO2:  (93% - 93%)  Wt(kg): --  General: AAO x 3, appropriate mood and affect    Ophthalmology Exam:  Visual acuity (sc): 20/25 OD, HM OS  Pupils: Round and reactive OD, pharmacologically dilated OS  Ttono: 11 OD, 38--->19 OS  Extraocular movements (EOMs): Full OU, no pain, no diplopia  Confrontational Visual Field (CVF): Full OD, unable to assess OS  Color Plates: 10/12 OD, unable to assess OS.     Pen Light Exam (PLE)  External: Flat OU  Lids/Lashes/Lacrimal Ducts: Flat OU    Sclera/Conjunctiva: W+Q OD, 2+ injection OS  Cornea: Cl OU  Anterior Chamber: D+F OU    Iris: Patent LPI OU  Lens: PCIOL OU    Fundus Exam: dilated with 1% tropicamide and 2.5% phenylephrine  Approval obtained from primary team for dilation  Patient aware that pupils can remained dilated for at least 4-6 hours  Exam performed with 20D lens    Vitreous: wnl OD, hazy OS  Disc, cup/disc: sharp and pink, 0.3 OD, no view OS  Macula: drusen OD, no view OS  Vessels: wnl OD, no view OS  Periphery: RPE changes OD, no view OS    B-scan: Vitritis OS, no obvious masses

## 2021-10-19 NOTE — ED PROVIDER NOTE - PROGRESS NOTE DETAILS
[place], PGY-2- Neuro evaluated patient in the ED- requesting CDU for further eval. Needs ophthalmology eval. Obtaining visual acuity and IOP [place], PGY-2- Page out to ophthalmology O'Joe DO PGY-2: call neuro informed them of no cdu beds. They will call back O'Joe DO PGY-2: cleared for discharge by both neuro and ophto. Will dc w/ their respective recs. Pt denies any further dizziness. Pt able to ambulate.

## 2021-10-19 NOTE — CONSULT NOTE ADULT - SUBJECTIVE AND OBJECTIVE BOX
Neurology Consultation  Fran Desir, PGY-2      HPI: Patient is a 87yo F PMHx of essential tremors on propranolol, stomach AVM, basal cell carcinoma s/p excision, glaucoma, macular degeneration, not on ac/ap who presents to ED as code stroke for dizziness and gait unsteadiness. She states that she was LKN 9PM on 10/19. Then noted today that she was persistently dizzy since the morning with associated nausea and gait difficulty from dizziness. Has had L eye macular degeneration with dilation (hence pupils 5mm) and R eye glaucoma. Not on ac/ap. No fevers, chills, headache, focal weakness, focal numbness, dysarthria, dysphagia, new hearing change, new sob, new cp.      (Stroke only)  NIHSS: 3,   MRS: 1     PAST MEDICAL & SURGICAL HISTORY:  Hypothyroidism    IBS (Irritable Bowel Syndrome)    AVM (arteriovenous malformation)  small intestinal    Renal stone  asymptomatic    Tremors of nervous system  left hand only    H/O basal cell carcinoma excision  nose    Sinus infection    Macular degeneration of left eye    Glaucoma  right eye    COVID-19 vaccine series completed    Closed fracture of left elbow    S/P appendectomy  childhood    S/P tonsillectomy  childhood    AVM (congenital arteriovenous malformation)  enteroscopy small intestines twice 2013 (twice 2012)    Prolapse of uterus  sling 2007    History of lithotripsy    History of open reduction and internal fixation (ORIF) procedure  right radius 10/20    FAMILY HISTORY:  FH: coronary artery disease (Father)    SOCIAL HISTORY: no smoking, drug use hx. occasional alcohol use    MEDICATIONS (HOME):  Home Medications:  B-Complex 50: 100 milligram(s) orally once a day (27 Sep 2021 16:00)  collagen: 3000 milligram(s) orally once a day (27 Sep 2021 16:00)  MISOPROSTOL 100 MCG TABLET: TAKE 1 TABLET 4 TIMES A DAY (27 Sep 2021 16:00)  Numaqula Omega-3 700 mg oral capsule: 3 tab(s) orally once a day (27 Sep 2021 16:00)  potassium citrate: 99 milligram(s) orally once a day (27 Sep 2021 16:00)  PROPRANOLOL 40 MG TABLET: 1 tab(s) orally once a day (15 Oct 2021 11:09)  Synthroid 112 mcg (0.112 mg) oral tablet: 1 tab(s) orally once a day (27 Sep 2021 16:00)  tozar: 3 tab(s) orally once a day (27 Sep 2021 16:00)  Vitamin D3 125 mcg (5000 intl units) oral capsule: 1 cap(s) orally once a day (27 Sep 2021 16:00)  Zinc 50 mg Pink oral capsule: 1 cap(s) orally once a day (27 Sep 2021 16:00)    MEDICATIONS  (STANDING):  brimonidine 0.2% Ophthalmic Solution 1 Drop(s) Left EYE Once  dorzolamide 2% Ophthalmic Solution 1 Drop(s) Left EYE Once  tetracaine 0.5% Solution 1 Drop(s) Both EYES Once  timolol 0.5% Solution 1 Drop(s) Left EYE Once    MEDICATIONS  (PRN):    ALLERGIES/INTOLERANCES:  Allergies  No Known Allergies    Intolerances    VITALS & EXAMINATION:  Vital Signs Last 24 Hrs  T(C): 36.7 (19 Oct 2021 17:12), Max: 36.7 (19 Oct 2021 17:12)  T(F): 98.1 (19 Oct 2021 17:12), Max: 98.1 (19 Oct 2021 17:12)  HR: 61 (19 Oct 2021 17:12) (61 - 61)  BP: 150/68 (19 Oct 2021 17:12) (150/68 - 150/68)  BP(mean): --  RR: 16 (19 Oct 2021 17:12) (16 - 16)  SpO2: 93% (19 Oct 2021 17:12) (93% - 93%)    General:  Constitutional: Female, appears stated age, in no apparent distress including pain  Head: Normocephalic & atraumatic; Eyes: clear sclera; Neck: supple  Resp: breathing comfortably, normal rate    Neurological (>12):  MS: Awake, alert, oriented to person, place, situation, time. Normal affect. Follows all commands. Cooperative.   Language: Speech is clear, fluent, intact with normal tone/rate/ volume and good repetition,  comprehension, registration of words. No perseverance.     CNs: R eye pupil round reactive. L pupil dilated. (R = 3mm, L = 5mm). VF reduced in left lower quadrant. EOMI L gaze nystagmus 4-5 beats. V1-3 intact to LT, well developed masseter muscles b/l. No facial asymmetry b/l, full eye closure strength b/l. Hearing grossly normal (rubbing fingers) b/l.       Motor: Normal muscle bulk & tone. + essential tremor, no seizure. No pronator drift. All extremities shoulder, elbow, hand , hip, knee 5/5     Sensation: Intact to LT  b/l,     Coordination: b/l hand essential tremors limit evaluation  Gait:  No postural instability, more so cautious gait from dizziness. Slightly leaning forward.     LABORATORY:  CBC                       13.8   5.88  )-----------( 278      ( 19 Oct 2021 17:34 )             43.5     Chem 10-19    141  |  106  |  22  ----------------------------<  103<H>  4.0   |  25  |  0.73    Ca    10.0      19 Oct 2021 17:34    TPro  6.8  /  Alb  4.3  /  TBili  0.5  /  DBili  x   /  AST  20  /  ALT  16  /  AlkPhos  92  10-19    LFTs LIVER FUNCTIONS - ( 19 Oct 2021 17:34 )  Alb: 4.3 g/dL / Pro: 6.8 g/dL / ALK PHOS: 92 U/L / ALT: 16 U/L / AST: 20 U/L / GGT: x           Coagulopathy PT/INR - ( 19 Oct 2021 17:34 )   PT: 13.3 sec;   INR: 1.11 ratio       PTT - ( 19 Oct 2021 17:34 )  PTT:35.8 sec  Lipid Panel   A1c   Cardiac enzymes     U/A   CSF  Other    STUDIES & IMAGING: (EEG, CT, MR, U/S, TTE/MACARIO):    < from: CT Angio Neck w/ IV Cont (10.19.21 @ 17:54) >    EXAM:  CT ANGIO BRAIN (W)AW IC                          EXAM:  CT ANGIO NECK (W)AW IC                          EXAM:  CT BRAIN STROKE PROTOCOL                          PROCEDURE DATE:  10/19/2021      INTERPRETATION:  CT angiography of the brain and neck    CLINICAL INDICATION: Code stroke, dizziness    TECHNIQUE: Direct axial CT scanning of the brain and neck was obtained from the vertex to the level of the clavicular heads after the dynamic intravenous injection of 70 cc of Omnipaque 300. 30 cc were discarded. Sagittal and coronal maximum intensity projection reformats were provided.  Three-dimensional reconstructions were performed by the radiologist using the [employer] workstation.    Additionally, noncontrast axial CT scanningof the brain was obtained from the skull base to the vertex. Sagittal and coronal reformats were provided.    COMPARISON: CT brain 12/7/2020    FINDINGS:    CT brain:    No hydrocephalus, mass effect, midline shift, acute intracranial hemorrhage, or brain edema.    Chronic left corona radiata infarct. Mild white matter microvascular ischemic disease.    Air-fluid level and near complete opacification of the left maxillary sinus. Mastoid air cells clear.    CTA brain:    Normal flow-related enhancement of the skull base/intracranial internal carotid arteries.    Normal flow-related enhancement of the bilateral anterior, middle, and posterior cerebral arteries. The right PCA demonstrates a fetal origin.    Anterior and left posterior communicating arteries are present.    Normal flow-related enhancement of the bilateral intradural vertebral arteries and the basilar artery.    No flow-limiting stenosis or vascular aneurysm. No AVM.    CTA neck:    Normal flow-related enhancement of the bilateral common and internal carotid arteries.    Normal flow-related enhancement of the bilateral vertebral arteries.    No flow-limiting stenosis, evidence for arterial dissection, or vascular aneurysm.    Nonspecific right lung pleural calcifications and pleural thickening are partially visualized.    IMPRESSION:    CT brain:  No hydrocephalus, acute intracranial hemorrhage, mass effect, or brain edema.    CTA brain:  No flow-limiting stenosis, vascular aneurysm, or AVM.    CTA neck:  No flow-limiting stenosis or evidence for arterial dissection.    Nonspecific right lung pleural calcifications and pleural thickening are partially visualized.    Dr. Carter discussed these findings with neurology consult team on 10/19/2021 608 PM with read back.    --- End of Report ---    KING CARTER MD; Attending Radiologist  This document has been electronically signed. Oct 19 2021  6:18PM    < end of copied text >   Neurology Consultation  Fran Desir, PGY-2      HPI: Patient is a 87yo F PMHx of essential tremors on propranolol, stomach AVM, basal cell carcinoma s/p excision, glaucoma, macular degeneration, not on ac/ap who presents to ED as code stroke for dizziness and gait unsteadiness. She states that she was LKN 9PM on 10/19. Then noted today that she was persistently dizzy since the morning with associated nausea and gait difficulty from dizziness. Dizziness not room spinning but rather that she is moving. Has had L eye macular degeneration with dilation (hence pupils 5mm) and R eye glaucoma. Not on ac/ap. No fevers, chills, headache, focal weakness, focal numbness, dysarthria, dysphagia, new hearing change, new sob, new cp.      (Stroke only)  NIHSS: 3,   MRS: 1     PAST MEDICAL & SURGICAL HISTORY:  Hypothyroidism    IBS (Irritable Bowel Syndrome)    AVM (arteriovenous malformation)  small intestinal    Renal stone  asymptomatic    Tremors of nervous system  left hand only    H/O basal cell carcinoma excision  nose    Sinus infection    Macular degeneration of left eye    Glaucoma  right eye    COVID-19 vaccine series completed    Closed fracture of left elbow    S/P appendectomy  childhood    S/P tonsillectomy  childhood    AVM (congenital arteriovenous malformation)  enteroscopy small intestines twice 2013 (twice 2012)    Prolapse of uterus  sling 2007    History of lithotripsy    History of open reduction and internal fixation (ORIF) procedure  right radius 10/20    FAMILY HISTORY:  FH: coronary artery disease (Father)    SOCIAL HISTORY: no smoking, drug use hx. occasional alcohol use    MEDICATIONS (HOME):  Home Medications:  B-Complex 50: 100 milligram(s) orally once a day (27 Sep 2021 16:00)  collagen: 3000 milligram(s) orally once a day (27 Sep 2021 16:00)  MISOPROSTOL 100 MCG TABLET: TAKE 1 TABLET 4 TIMES A DAY (27 Sep 2021 16:00)  Numaqula Omega-3 700 mg oral capsule: 3 tab(s) orally once a day (27 Sep 2021 16:00)  potassium citrate: 99 milligram(s) orally once a day (27 Sep 2021 16:00)  PROPRANOLOL 40 MG TABLET: 1 tab(s) orally once a day (15 Oct 2021 11:09)  Synthroid 112 mcg (0.112 mg) oral tablet: 1 tab(s) orally once a day (27 Sep 2021 16:00)  tozar: 3 tab(s) orally once a day (27 Sep 2021 16:00)  Vitamin D3 125 mcg (5000 intl units) oral capsule: 1 cap(s) orally once a day (27 Sep 2021 16:00)  Zinc 50 mg Pink oral capsule: 1 cap(s) orally once a day (27 Sep 2021 16:00)    MEDICATIONS  (STANDING):  brimonidine 0.2% Ophthalmic Solution 1 Drop(s) Left EYE Once  dorzolamide 2% Ophthalmic Solution 1 Drop(s) Left EYE Once  tetracaine 0.5% Solution 1 Drop(s) Both EYES Once  timolol 0.5% Solution 1 Drop(s) Left EYE Once    MEDICATIONS  (PRN):    ALLERGIES/INTOLERANCES:  Allergies  No Known Allergies    Intolerances    VITALS & EXAMINATION:  Vital Signs Last 24 Hrs  T(C): 36.7 (19 Oct 2021 17:12), Max: 36.7 (19 Oct 2021 17:12)  T(F): 98.1 (19 Oct 2021 17:12), Max: 98.1 (19 Oct 2021 17:12)  HR: 61 (19 Oct 2021 17:12) (61 - 61)  BP: 150/68 (19 Oct 2021 17:12) (150/68 - 150/68)  BP(mean): --  RR: 16 (19 Oct 2021 17:12) (16 - 16)  SpO2: 93% (19 Oct 2021 17:12) (93% - 93%)    General:  Constitutional: Female, appears stated age, in no apparent distress including pain  Head: Normocephalic & atraumatic; Eyes: clear sclera; Neck: supple  Resp: breathing comfortably, normal rate    Neurological (>12):  MS: Awake, alert, oriented to person, place, situation, time. Normal affect. Follows all commands. Cooperative.   Language: Speech is clear, fluent, intact with normal tone/rate/ volume and good repetition,  comprehension, registration of words. No perseverance.     CNs: R eye pupil round reactive. L pupil dilated. (R = 3mm, L = 5mm). VF reduced in left lower quadrant. EOMI L gaze nystagmus 4-5 beats. V1-3 intact to LT, well developed masseter muscles b/l. No facial asymmetry b/l, full eye closure strength b/l. Hearing grossly normal (rubbing fingers) b/l.       Motor: Normal muscle bulk & tone. + essential tremor, no seizure. No pronator drift. All extremities shoulder, elbow, hand , hip, knee 5/5     Sensation: Intact to LT  b/l,     Coordination: b/l hand essential tremors limit evaluation  Gait:  No postural instability, more so cautious gait from dizziness. Slightly leaning forward.     LABORATORY:  CBC                       13.8   5.88  )-----------( 278      ( 19 Oct 2021 17:34 )             43.5     Chem 10-19    141  |  106  |  22  ----------------------------<  103<H>  4.0   |  25  |  0.73    Ca    10.0      19 Oct 2021 17:34    TPro  6.8  /  Alb  4.3  /  TBili  0.5  /  DBili  x   /  AST  20  /  ALT  16  /  AlkPhos  92  10-19    LFTs LIVER FUNCTIONS - ( 19 Oct 2021 17:34 )  Alb: 4.3 g/dL / Pro: 6.8 g/dL / ALK PHOS: 92 U/L / ALT: 16 U/L / AST: 20 U/L / GGT: x           Coagulopathy PT/INR - ( 19 Oct 2021 17:34 )   PT: 13.3 sec;   INR: 1.11 ratio       PTT - ( 19 Oct 2021 17:34 )  PTT:35.8 sec  Lipid Panel   A1c   Cardiac enzymes     U/A   CSF  Other    STUDIES & IMAGING: (EEG, CT, MR, U/S, TTE/MACARIO):    < from: CT Angio Neck w/ IV Cont (10.19.21 @ 17:54) >    EXAM:  CT ANGIO BRAIN (W)AW IC                          EXAM:  CT ANGIO NECK (W)AW IC                          EXAM:  CT BRAIN STROKE PROTOCOL                          PROCEDURE DATE:  10/19/2021      INTERPRETATION:  CT angiography of the brain and neck    CLINICAL INDICATION: Code stroke, dizziness    TECHNIQUE: Direct axial CT scanning of the brain and neck was obtained from the vertex to the level of the clavicular heads after the dynamic intravenous injection of 70 cc of Omnipaque 300. 30 cc were discarded. Sagittal and coronal maximum intensity projection reformats were provided.  Three-dimensional reconstructions were performed by the radiologist using the [employer] workstation.    Additionally, noncontrast axial CT scanningof the brain was obtained from the skull base to the vertex. Sagittal and coronal reformats were provided.    COMPARISON: CT brain 12/7/2020    FINDINGS:    CT brain:    No hydrocephalus, mass effect, midline shift, acute intracranial hemorrhage, or brain edema.    Chronic left corona radiata infarct. Mild white matter microvascular ischemic disease.    Air-fluid level and near complete opacification of the left maxillary sinus. Mastoid air cells clear.    CTA brain:    Normal flow-related enhancement of the skull base/intracranial internal carotid arteries.    Normal flow-related enhancement of the bilateral anterior, middle, and posterior cerebral arteries. The right PCA demonstrates a fetal origin.    Anterior and left posterior communicating arteries are present.    Normal flow-related enhancement of the bilateral intradural vertebral arteries and the basilar artery.    No flow-limiting stenosis or vascular aneurysm. No AVM.    CTA neck:    Normal flow-related enhancement of the bilateral common and internal carotid arteries.    Normal flow-related enhancement of the bilateral vertebral arteries.    No flow-limiting stenosis, evidence for arterial dissection, or vascular aneurysm.    Nonspecific right lung pleural calcifications and pleural thickening are partially visualized.    IMPRESSION:    CT brain:  No hydrocephalus, acute intracranial hemorrhage, mass effect, or brain edema.    CTA brain:  No flow-limiting stenosis, vascular aneurysm, or AVM.    CTA neck:  No flow-limiting stenosis or evidence for arterial dissection.    Nonspecific right lung pleural calcifications and pleural thickening are partially visualized.    Dr. Carter discussed these findings with neurology consult team on 10/19/2021 608 PM with read back.    --- End of Report ---    KING CARTER MD; Attending Radiologist  This document has been electronically signed. Oct 19 2021  6:18PM    < end of copied text >

## 2021-10-19 NOTE — ED PROVIDER NOTE - PATIENT PORTAL LINK FT
You can access the FollowMyHealth Patient Portal offered by [facility] by registering at the following website: [email or web address]. By joining [employer]’s FollowMyHealth portal, you will also be able to view your health information using other applications (apps) compatible with our system.

## 2021-10-19 NOTE — ED PROVIDER NOTE - NSFOLLOWUPINSTRUCTIONS_ED_ALL_ED_FT
(1) Follow up with your primary care physician as discussed. Please call the numbers listed below or [phone] to set up the necessary appointments.  (2) Immediately seek care at your nearest emergency room if your symptoms worsen, persist, or do not resolve   (3) Take the prescribed medication as instructed:      Follow up with Dr. Jones (neurology) within 1 week: [phone]    Follow-up with Dr. Ramsey of [facility] - tomorrow 10/20/21

## 2021-10-20 LAB
CULTURE RESULTS: NO GROWTH — SIGNIFICANT CHANGE UP
SPECIMEN SOURCE: SIGNIFICANT CHANGE UP

## 2021-10-25 ENCOUNTER — APPOINTMENT (OUTPATIENT)
Dept: ORTHOPEDIC SURGERY | Facility: CLINIC | Age: 86
End: 2021-10-25
Payer: MEDICARE

## 2021-10-28 ENCOUNTER — APPOINTMENT (OUTPATIENT)
Dept: ORTHOPEDIC SURGERY | Facility: CLINIC | Age: 86
End: 2021-10-28
Payer: MEDICARE

## 2021-10-28 DIAGNOSIS — S52.501D UNSPECIFIED FRACTURE OF THE LOWER END OF RIGHT RADIUS, SUBSEQUENT ENCOUNTER FOR CLOSED FRACTURE WITH ROUTINE HEALING: ICD-10-CM

## 2021-10-28 DIAGNOSIS — S52.601D UNSPECIFIED FRACTURE OF THE LOWER END OF RIGHT RADIUS, SUBSEQUENT ENCOUNTER FOR CLOSED FRACTURE WITH ROUTINE HEALING: ICD-10-CM

## 2021-10-28 PROCEDURE — 73110 X-RAY EXAM OF WRIST: CPT | Mod: RT

## 2021-10-28 PROCEDURE — 99024 POSTOP FOLLOW-UP VISIT: CPT

## 2021-10-28 RX ORDER — DESONIDE 0.5 MG/G
0.05 OINTMENT TOPICAL
Qty: 60 | Refills: 0 | Status: ACTIVE | COMMUNITY
Start: 2021-07-12

## 2021-10-28 RX ORDER — DIFLUPREDNATE 0.5 MG/ML
0.05 EMULSION OPHTHALMIC
Qty: 5 | Refills: 0 | Status: ACTIVE | COMMUNITY
Start: 2021-10-18

## 2021-10-28 RX ORDER — ATROPINE SULFATE 10 MG/ML
1 SOLUTION OPHTHALMIC
Qty: 15 | Refills: 0 | Status: ACTIVE | COMMUNITY
Start: 2021-10-18

## 2021-10-28 RX ORDER — MISOPROSTOL 100 UG/1
100 TABLET ORAL
Qty: 120 | Refills: 0 | Status: ACTIVE | COMMUNITY
Start: 2021-05-30

## 2021-10-28 RX ORDER — KETOROLAC TROMETHAMINE 5 MG/ML
0.5 SOLUTION OPHTHALMIC
Qty: 5 | Refills: 0 | Status: ACTIVE | COMMUNITY
Start: 2021-07-07

## 2021-10-28 RX ORDER — FOLIC ACID 1 MG/1
1 TABLET ORAL
Qty: 30 | Refills: 0 | Status: ACTIVE | COMMUNITY
Start: 2021-05-19

## 2021-10-28 RX ORDER — IBUPROFEN 600 MG/1
600 TABLET, FILM COATED ORAL
Qty: 30 | Refills: 0 | Status: ACTIVE | COMMUNITY
Start: 2021-10-15

## 2021-10-28 NOTE — HISTORY OF PRESENT ILLNESS
[de-identified] : s/p removal of right distal radius plate and screws [de-identified] : The patient is a 86 year female who returns for the 1st postoperative visit after undergoing removal of right distal radius plate and screws at [facility]. The surgery was on 10/15/2021. The patient is recovering at home. She reports no postoperative pain. She states the wrist is feeling better overall now that the hardware has been removed.  [de-identified] : Patient is WDWN, alert, and in no acute distress. Breathing is unlabored. She is grossly oriented to person, place, and time.\par \par Dissolvable sutures in place with overlying steri strips.\par Mild edema and tenderness noted postoperatively.\par FROM of the wrist into flexion and extension without pain. [de-identified] : AP, lateral and oblique views of the right wrist were obtained today and revealed no presence of hardware.	  [de-identified] : She may use Vitamin E oil on the scar once the steri strips fall off.\par She was instructed on ROM exercises of the wrist.\par As she has good ROM at this time, I am not recommending she go for hand therapy. Furthermore, she has deferred a prescription for OT. \par Follow up in 6 weeks.

## 2021-10-28 NOTE — ADDENDUM
[FreeTextEntry1] : I, Chel Perera wrote this note acting as a scribe for Dr. Vinay Vallejo on Oct 28, 2021.

## 2021-10-28 NOTE — END OF VISIT
[FreeTextEntry3] : All medical record entries made by the Scribe were at my,  Dr. Vinay Vallejo MD., direction and personally dictated by me on 10/28/2021. I have personally reviewed the chart and agree that the record accurately reflects my personal performance of the history, physical exam, assessment and plan.

## 2022-10-13 NOTE — ED ADULT NURSE REASSESSMENT NOTE - NS ED NURSE REASSESS COMMENT FT1
MD at bedside repairing lac to back of pt head. Pt safety maintained. Information: Selecting Yes will display possible errors in your note based on the variables you have selected. This validation is only offered as a suggestion for you. PLEASE NOTE THAT THE VALIDATION TEXT WILL BE REMOVED WHEN YOU FINALIZE YOUR NOTE. IF YOU WANT TO FAX A PRELIMINARY NOTE YOU WILL NEED TO TOGGLE THIS TO 'NO' IF YOU DO NOT WANT IT IN YOUR FAXED NOTE.

## 2023-12-07 NOTE — H&P PST ADULT - PSYCHIATRIC
negative Anticipated Starting Dosage (Optional): 20mg Daily Detail Level: Zone Ipledge Number (Optional): [id number] Initial Beta Hcg (Optional): 12/06/2023 negative Comments: Plan to start at a low dose due to dryness. Patient Reported Weight (Optional - Include Units): 195

## 2024-01-30 ENCOUNTER — APPOINTMENT (OUTPATIENT)
Dept: PODIATRY | Facility: CLINIC | Age: 89
End: 2024-01-30
Payer: MEDICARE

## 2024-01-30 DIAGNOSIS — M72.2 PLANTAR FASCIAL FIBROMATOSIS: ICD-10-CM

## 2024-01-30 PROCEDURE — 73630 X-RAY EXAM OF FOOT: CPT | Mod: LT

## 2024-01-30 PROCEDURE — 99203 OFFICE O/P NEW LOW 30 MIN: CPT

## 2024-02-05 NOTE — HISTORY OF PRESENT ILLNESS
[FreeTextEntry1] : Patient presents today with a chief complaint of pain, plantar medial aspect of the left heel, especially in the mornings.  It has been present now for the past 1 - 2 weeks.  Patient has typical post static dyskinesia and pain on palpation of the medial tuberosity.

## 2024-02-05 NOTE — ASSESSMENT
[FreeTextEntry1] : Impression: Plantar fasciitis, left (M72.2).  Treatment: Since it has only been present for 10 - 14 days, patient given a prescription for physical therapy.  She cannot take non-steroidals and was therefore advised to try some Tylenol.  She was advised on change of shoe gear.  She was given stretching exercises.  Will reevaluate in 2 - 3 weeks, if pain persists.  Any questions or problems, patient is to contact the office.

## 2024-02-05 NOTE — PROCEDURE
[FreeTextEntry1] : X-rays were taken to rule out for any stress injury. (3 views - left foot) No evidence of any fracture/dislocation.  No evidence of any calcaneal spurring.

## 2024-02-05 NOTE — PHYSICAL EXAM
[Varicose Veins Of Lower Extremities] : bilaterally [Ankle Swelling On The Left] : moderate [2+] : left foot dorsalis pedis 2+ [No Joint Swelling] : no joint swelling [Normal Foot/Ankle] : Both lower extremities were exposed and visualized. Standing exam demonstrates normal foot posture and alignment. Hindfoot exam shows no hindfoot valgus or varus [Skin Color & Pigmentation] : normal skin color and pigmentation [Skin Turgor] : normal skin turgor [Skin Lesions] : no skin lesions [Sensation] : the sensory exam was normal to light touch and pinprick [No Focal Deficits] : no focal deficits [Deep Tendon Reflexes (DTR)] : deep tendon reflexes were 2+ and symmetric [Motor Exam] : the motor exam was normal [General Appearance - Alert] : alert [Ankle Swelling On The Right] : mild [Oriented To Time, Place, And Person] : oriented to person, place, and time [Ankle Swelling (On Exam)] : not present [] : not present [Delayed in the Right Toes] : capillary refills normal in right toes [Delayed in the Left Toes] : capillary refills normal in the left toes [de-identified] : Pain on palpation of the medial tuberosity of the left heel and at the insertion of the plantar fascia.  Pain with stretching of the plantar fascia.  No pain on medial to lateral compression of the heel. [Foot Ulcer] : no foot ulcer [Skin Induration] : no skin induration

## 2024-02-05 NOTE — CONSULT LETTER
[Dear  ___] : Dear  [unfilled], [Courtesy Letter:] : I had the pleasure of seeing your patient, [unfilled], in my office today. [Please see my note below.] : Please see my note below. [Consult Closing:] : Thank you very much for allowing me to participate in the care of this patient.  If you have any questions, please do not hesitate to contact me. [Sincerely,] : Sincerely, [FreeTextEntry2] : Geoffrey De La Torre MD [phone] [place] [id number]  [FreeTextEntry3] : Charles M. Lombardi, DPM, FACFAS Systems Chief, Podiatric Services [facility] Assistant Professor of Surgery [employer] at [facility]

## 2024-04-29 ENCOUNTER — APPOINTMENT (OUTPATIENT)
Dept: PULMONOLOGY | Facility: CLINIC | Age: 89
End: 2024-04-29
Payer: MEDICARE

## 2024-04-29 VITALS
RESPIRATION RATE: 16 BRPM | HEART RATE: 75 BPM | OXYGEN SATURATION: 94 % | WEIGHT: 150 LBS | TEMPERATURE: 98.4 F | HEIGHT: 62 IN | DIASTOLIC BLOOD PRESSURE: 62 MMHG | BODY MASS INDEX: 27.6 KG/M2 | SYSTOLIC BLOOD PRESSURE: 138 MMHG

## 2024-04-29 DIAGNOSIS — R94.2 ABNORMAL RESULTS OF PULMONARY FUNCTION STUDIES: ICD-10-CM

## 2024-04-29 DIAGNOSIS — Z87.891 PERSONAL HISTORY OF NICOTINE DEPENDENCE: ICD-10-CM

## 2024-04-29 DIAGNOSIS — E66.3 OVERWEIGHT: ICD-10-CM

## 2024-04-29 DIAGNOSIS — Z86.69 PERSONAL HISTORY OF OTHER DISEASES OF THE NERVOUS SYSTEM AND SENSE ORGANS: ICD-10-CM

## 2024-04-29 DIAGNOSIS — Z82.49 FAMILY HISTORY OF ISCHEMIC HEART DISEASE AND OTHER DISEASES OF THE CIRCULATORY SYSTEM: ICD-10-CM

## 2024-04-29 DIAGNOSIS — Z80.42 FAMILY HISTORY OF MALIGNANT NEOPLASM OF PROSTATE: ICD-10-CM

## 2024-04-29 DIAGNOSIS — Z81.8 FAMILY HISTORY OF OTHER MENTAL AND BEHAVIORAL DISORDERS: ICD-10-CM

## 2024-04-29 DIAGNOSIS — Z82.0 FAMILY HISTORY OF EPILEPSY AND OTHER DISEASES OF THE NERVOUS SYSTEM: ICD-10-CM

## 2024-04-29 PROCEDURE — 94729 DIFFUSING CAPACITY: CPT

## 2024-04-29 PROCEDURE — 71046 X-RAY EXAM CHEST 2 VIEWS: CPT

## 2024-04-29 PROCEDURE — ZZZZZ: CPT

## 2024-04-29 PROCEDURE — 99203 OFFICE O/P NEW LOW 30 MIN: CPT | Mod: 25

## 2024-04-29 PROCEDURE — 94060 EVALUATION OF WHEEZING: CPT

## 2024-04-29 PROCEDURE — 94618 PULMONARY STRESS TESTING: CPT

## 2024-04-29 PROCEDURE — 95012 NITRIC OXIDE EXP GAS DETER: CPT

## 2024-04-29 PROCEDURE — 99204 OFFICE O/P NEW MOD 45 MIN: CPT | Mod: 25

## 2024-04-29 PROCEDURE — 94727 GAS DIL/WSHOT DETER LNG VOL: CPT

## 2024-04-29 RX ORDER — AMOXICILLIN 875 MG/1
875 TABLET, FILM COATED ORAL
Qty: 20 | Refills: 0 | Status: DISCONTINUED | COMMUNITY
Start: 2021-06-03 | End: 2024-04-29

## 2024-04-29 RX ORDER — AMOXICILLIN AND CLAVULANATE POTASSIUM 500; 125 MG/1; MG/1
500-125 TABLET, FILM COATED ORAL
Qty: 21 | Refills: 0 | Status: DISCONTINUED | COMMUNITY
Start: 2017-12-22 | End: 2024-04-29

## 2024-04-29 RX ORDER — METHYLPREDNISOLONE 4 MG/1
4 TABLET ORAL
Qty: 1 | Refills: 0 | Status: DISCONTINUED | COMMUNITY
Start: 2018-03-16 | End: 2024-04-29

## 2024-04-29 RX ORDER — PROPRANOLOL HYDROCHLORIDE 40 MG/1
40 TABLET ORAL
Qty: 180 | Refills: 0 | Status: DISCONTINUED | COMMUNITY
Start: 2016-03-31 | End: 2024-04-29

## 2024-04-29 RX ORDER — CEFUROXIME AXETIL 500 MG/1
500 TABLET ORAL
Qty: 20 | Refills: 0 | Status: DISCONTINUED | COMMUNITY
Start: 2021-09-23 | End: 2024-04-29

## 2024-04-29 RX ORDER — PROPRANOLOL HYDROCHLORIDE 10 MG/1
10 TABLET ORAL
Refills: 0 | Status: ACTIVE | COMMUNITY

## 2024-04-29 RX ORDER — CELECOXIB 200 MG/1
200 CAPSULE ORAL
Qty: 30 | Refills: 0 | Status: DISCONTINUED | COMMUNITY
Start: 2021-05-19 | End: 2024-04-29

## 2024-04-29 RX ORDER — MOXIFLOXACIN HYDROCHLORIDE TABLETS, 400 MG 400 MG/1
400 TABLET, FILM COATED ORAL
Qty: 10 | Refills: 0 | Status: DISCONTINUED | COMMUNITY
Start: 2021-10-18 | End: 2024-04-29

## 2024-04-29 NOTE — ADDENDUM
[FreeTextEntry1] : Documented by Alfredo Sanders acting as a scribe for Dr. Wilmer Madrid on 04/29/2024 All medical record entries made by the Scribe were at my, Dr. Wilmer Madrid's, direction and personally dictated by me on 04/29/2024 . I have reviewed the chart and agree that the record accurately reflects my personal performance of the history, physical exam, assessment and plan. I have also personally directed, reviewed, and agree with the discharge instructions.

## 2024-04-29 NOTE — HISTORY OF PRESENT ILLNESS
[TextBox_4] : Ms. HAYWOOD is an 89 year old female with a history of former ~40 pack year smoker, cervical radiculitis, hypothyroid, kidney stones, OW, plantar fasciitis, small bowel AVMs and subsequent GI bleeds, IBS, UTIs, glaucoma, macular degeneration, essential tremor, abnormal chest X-ray (2019) and CT (4/2024) presenting to the office today for an initial pulmonary evaluation. Her chief complaint i  -she denies a history of asbestos exposure -she denies SOB -she notes she started smoking when she was 18 (1951), and stopped in 1991 -she denies a history of TB for herself or her family members -she notes she's lost 10 lbs since she fell in 3/2024 due to poor appetite -she notes back pain and R hip pain. She feels her R hip pain when she lies down to sleep -she denies seasonal allergies -she notes dysphagia when she swallows her pills at night -she notes she had PNA 2022, and she can't recall if she had PNA prior, even as a child -she denies respiratory issues as a child -she notes energy levels are poor -she notes poor quality of sleep, for which she takes TylenolPM -she notes waking up fatigued. She wakes up at 4AM for an unknown reason, so she eats breakfast and sleeps for another 2-3 hours -she notes her memory and concentration are poor -she notes ability to fall asleep while watching a boring TV show and while in a moving car  -she notes taking propranolol for her L hand tremor   -she denies any headaches, nausea, emesis, fever, chills, sweats, chest pain, chest pressure, coughing, wheezing, palpitations, diarrhea, constipation, vertigo, leg swelling, itchy eyes, itchy ears, heartburn, reflux, or sour taste in the mouth.

## 2024-04-29 NOTE — ASSESSMENT
[FreeTextEntry1] : Ms. HAYWOOD is an 89 year old female with a history of former ~40 pack year smoker, cervical radiculitis, hypothyroid, kidney stones, OW, plantar fasciitis, small bowel AVMs and subsequent GI bleeds, IBS, UTIs, glaucoma, macular degeneration, essential tremor, abnormal chest X-ray (2019) and CT (4/2024) who now comes to the office for an initial pulmonary evaluation for abnormal chest CT, ?prior unknown asbestos exposure causing "pleural plaques" and ?RLL nodule, ?LLL nodule, abnormal PFTs (mildly reduced diffusion, inspiratory limb, FENO), ?INGRID.    Problem 1: Abnormal chest CT, former 40 pack year smoker 1A. pleural changes c/w ?prior asbestos exposure or prior pleural infection (dates back to at least 2019) 1B. pulmonary nodules ?inflammatory, infectious, or malignant  -complete PET CT 4/2024 -complete NODIFY 4/2024 -consider CTS evaluation based on results -get 2nd opinion radiologic review of scans -CAT scans are the only radiological modality to identify abnormalities within the lungs with regards to nodules/masses/lymph nodes. Risks, benefits were reviewed in detail. The guidelines for abnormalities include follow up CT scans at various intervals which could range from 6 weeks to 1 year intervals. If there is a change for the worse then consideration for a biopsy will be considered if the patient is a candidate. Second opinion evaluation with a thoracic surgeon or an interventional radiologist could be offered.    Problem 2: Abnormal PFTs 2A. mildly reduced diffusion c/w prior smoking history 2B. abnormal inspiratory limb c/w ?INGRID 2C. elevated FENO due to ?allergies -normal flows, volumes, and 6mwt   Problem 3: ?INGRID (elevated Mallampati class, poor quality sleep, poor memory) -complete home sleep study -Sleep apnea is associated with adverse clinical consequences which can affect most organ systems. Cardiovascular disease risk includes arrhythmias, atrial fibrillation, hypertension, coronary artery disease, and stroke. Metabolic disorders include diabetes type 2, non-alcoholic fatty liver disease. Mood disorder especially depression; and cognitive decline especially in the elderly. Associations with chronic reflux/Bolden's esophagus some but not all inclusive. -Reasons include arousal consistent with hypopnea; respiratory events most prominent in REM sleep or supine position; therefore sleep staging and body position are important for accurate diagnosis and estimation of AHI.  Problem 4: cardiac disease -recommended to continue to follow up with Cardiologist (Dr. Emile Connelly)   Problem 5: health maintenance -recommended yearly flu shot after October 15, 2023 -recommended strep pneumonia vaccines: Prevnar-20 vaccine -recommended early intervention for Upper Respiratory Infections (URIs) -recommended regular osteoporosis evaluations -recommended early dermatological evaluations -recommended after the age of 50 to the age of 70, colonoscopy every 5 years   F/P in 6-8 weeks. She is encouraged to call with any changes, concerns, or questions

## 2024-04-29 NOTE — PHYSICAL EXAM
[No Acute Distress] : no acute distress [Normal Oropharynx] : normal oropharynx [Normal Appearance] : normal appearance [No Neck Mass] : no neck mass [Normal Rate/Rhythm] : normal rate/rhythm [Normal S1, S2] : normal s1, s2 [No Murmurs] : no murmurs [No Resp Distress] : no resp distress [Clear to Auscultation Bilaterally] : clear to auscultation bilaterally [No Abnormalities] : no abnormalities [Benign] : benign [Normal Gait] : normal gait [No Clubbing] : no clubbing [No Cyanosis] : no cyanosis [No Edema] : no edema [FROM] : FROM [Normal Color/ Pigmentation] : normal color/ pigmentation [No Focal Deficits] : no focal deficits [Oriented x3] : oriented x3 [Normal Affect] : normal affect [III] : Mallampati Class: III [TextBox_68] : I:E: 1:3; clear

## 2024-04-29 NOTE — REASON FOR VISIT
[Initial] : an initial visit [TextBox_44] : abnormal chest CT, ?prior unknown asbestos exposure causing "pleural plaques" and ?RLL nodule, ?LLL nodule, abnormal PFTs (mildly reduced diffusion, inspiratory limb, FENO), ?INGRID

## 2024-04-29 NOTE — PROCEDURE
[FreeTextEntry1] : CXR (04/29/2024) reveals a normal sized heart; significant pleural calcifications and changes of the R lung, L lung is clear   CXR (11/2019) revealed a large R pleural plaque, opacity in RLL, heart size normal  Chest CT (4/23/2024) revealed  1. Heavily calcified right anterior and posterior pleural calcifications/plaques which may be related to prior fibrothroax versus asbestos related disease of the pleura. 2. Anterior RLL 14 mm nodule along the R major fissure, uncertain if it reflects noncalcified pleural plaque versus subpleural pulmonary nodule 3. ?central pulmonary nodule medial LLL measuring 16 mm versus confluence of vessels 4. Additional small 4 mm LLL solid pulmonary nodule.   Full PFT reveals normal flows; FEV1 was 1.43L which is 99% of predicted, with no change on BD; normal lung volumes; mildly reduced diffusion at 10.8, which is 65% of predicted; abnormal inspiratory limb. PFTs were performed to evaluate for SOB  6 minute walk test reveals a low saturation of 91% with no evidence of dyspnea or fatigue; walked 391.2 meters   FENO was 36; a normal value being less than 25 Fractional exhaled nitric oxide (FENO) is regarded as a simple, noninvasive method for assessing eosinophilic airway inflammation. Produced by a variety of cells within the lung, nitric oxide (NO) concentrations are generally low in healthy individuals. However, high concentrations of NO appear to be involved in nonspecific host defense mechanisms and chronic inflammatory diseases such as asthma. The American Thoracic Society (ATS) therefore has recommended using FENO to aid in the diagnosis and monitoring of eosinophilic airway inflammation and asthma, and for identifying steroid responsive individuals whose chronic respiratory symptoms may be caused by airway inflammation.

## 2024-04-30 ENCOUNTER — OUTPATIENT (OUTPATIENT)
Dept: OUTPATIENT SERVICES | Facility: HOSPITAL | Age: 89
LOS: 1 days | End: 2024-04-30
Payer: MEDICARE

## 2024-04-30 ENCOUNTER — APPOINTMENT (OUTPATIENT)
Dept: RADIOLOGY | Facility: IMAGING CENTER | Age: 89
End: 2024-04-30
Payer: MEDICARE

## 2024-04-30 DIAGNOSIS — Z72.820 SLEEP DEPRIVATION: ICD-10-CM

## 2024-04-30 DIAGNOSIS — Z98.890 OTHER SPECIFIED POSTPROCEDURAL STATES: Chronic | ICD-10-CM

## 2024-04-30 PROCEDURE — 71046 X-RAY EXAM CHEST 2 VIEWS: CPT | Mod: 26

## 2024-04-30 PROCEDURE — 71046 X-RAY EXAM CHEST 2 VIEWS: CPT

## 2024-05-17 NOTE — BRIEF OPERATIVE NOTE - ANTIBIOTIC PROTOCOL
[Dear  ___] : Dear  [unfilled], [Cefotetan/Followed protocol

## 2024-05-27 NOTE — REASON FOR VISIT
Patient's daughter notified of test results and Dr Sanabria's recommendations. Patient's daughter is wondering if the patient has a UTI as she seems more confused lately.Patient's daughter verbalized understanding and agreeable.    Per verbal from Dr Sanabria, macrobid 100 mg BID x 5 days while waiting for Urine results.    Left message for patient's daughter to call back.   [Consultation] : a consultation visit

## 2024-05-28 ENCOUNTER — APPOINTMENT (OUTPATIENT)
Dept: THORACIC SURGERY | Facility: CLINIC | Age: 89
End: 2024-05-28
Payer: MEDICARE

## 2024-05-28 VITALS
RESPIRATION RATE: 16 BRPM | OXYGEN SATURATION: 96 % | DIASTOLIC BLOOD PRESSURE: 91 MMHG | BODY MASS INDEX: 27.97 KG/M2 | WEIGHT: 152 LBS | SYSTOLIC BLOOD PRESSURE: 161 MMHG | HEART RATE: 65 BPM | HEIGHT: 62 IN

## 2024-05-28 DIAGNOSIS — R91.1 SOLITARY PULMONARY NODULE: ICD-10-CM

## 2024-05-28 PROCEDURE — 99215 OFFICE O/P EST HI 40 MIN: CPT

## 2024-05-28 PROCEDURE — 99205 OFFICE O/P NEW HI 60 MIN: CPT

## 2024-05-28 RX ORDER — PREDNISOLONE ACETATE 10 MG/ML
1 SUSPENSION/ DROPS OPHTHALMIC
Qty: 5 | Refills: 0 | Status: COMPLETED | COMMUNITY
Start: 2018-02-16 | End: 2024-05-28

## 2024-05-28 RX ORDER — MOXIFLOXACIN OPHTHALMIC 5 MG/ML
0.5 SOLUTION/ DROPS OPHTHALMIC
Qty: 3 | Refills: 0 | Status: COMPLETED | COMMUNITY
Start: 2021-10-18 | End: 2024-05-28

## 2024-05-28 RX ORDER — OXYCODONE AND ACETAMINOPHEN 5; 325 MG/1; MG/1
5-325 TABLET ORAL
Qty: 5 | Refills: 0 | Status: COMPLETED | COMMUNITY
Start: 2021-10-15 | End: 2024-05-28

## 2024-05-28 NOTE — PHYSICAL EXAM
[Restricted in physically strenuous activity but ambulatory and able to carry out work of a light or sedentary nature] : Status 1- Restricted in physically strenuous activity but ambulatory and able to carry out work of a light or sedentary nature, e.g., light house work, office work [General Appearance - Alert] : alert [General Appearance - In No Acute Distress] : in no acute distress [Sclera] : the sclera and conjunctiva were normal [PERRL With Normal Accommodation] : pupils were equal in size, round, and reactive to light [Extraocular Movements] : extraocular movements were intact [Outer Ear] : the ears and nose were normal in appearance [Oropharynx] : the oropharynx was normal [Neck Appearance] : the appearance of the neck was normal [Neck Cervical Mass (___cm)] : no neck mass was observed [Jugular Venous Distention Increased] : there was no jugular-venous distention [Thyroid Diffuse Enlargement] : the thyroid was not enlarged [Thyroid Nodule] : there were no palpable thyroid nodules [Auscultation Breath Sounds / Voice Sounds] : lungs were clear to auscultation bilaterally [Heart Rate And Rhythm] : heart rate was normal and rhythm regular [Heart Sounds] : normal S1 and S2 [Heart Sounds Gallop] : no gallops [Murmurs] : no murmurs [Heart Sounds Pericardial Friction Rub] : no pericardial rub [Examination Of The Chest] : the chest was normal in appearance [Chest Visual Inspection Thoracic Asymmetry] : no chest asymmetry [Diminished Respiratory Excursion] : normal chest expansion [2+] : left 2+ [Bowel Sounds] : normal bowel sounds [Abdomen Soft] : soft [Abdomen Tenderness] : non-tender [Abdomen Mass (___ Cm)] : no abdominal mass palpated [Cervical Lymph Nodes Enlarged Posterior Bilaterally] : posterior cervical [Cervical Lymph Nodes Enlarged Anterior Bilaterally] : anterior cervical [Supraclavicular Lymph Nodes Enlarged Bilaterally] : supraclavicular [Abnormal Walk] : normal gait [Nail Clubbing] : no clubbing  or cyanosis of the fingernails [Musculoskeletal - Swelling] : no joint swelling seen [Motor Tone] : muscle strength and tone were normal [Skin Color & Pigmentation] : normal skin color and pigmentation [Skin Turgor] : normal skin turgor [] : no rash [Deep Tendon Reflexes (DTR)] : deep tendon reflexes were 2+ and symmetric [Sensation] : the sensory exam was normal to light touch and pinprick [No Focal Deficits] : no focal deficits [Oriented To Time, Place, And Person] : oriented to person, place, and time [Impaired Insight] : insight and judgment were intact [Affect] : the affect was normal [FreeTextEntry1] : deferred

## 2024-05-28 NOTE — CONSULT LETTER
[Dear  ___] : Dear  [unfilled], [Consult Letter:] : I had the pleasure of evaluating your patient, [unfilled]. [Please see my note below.] : Please see my note below. [Consult Closing:] : Thank you very much for allowing me to participate in the care of this patient.  If you have any questions, please do not hesitate to contact me. [Sincerely,] : Sincerely, [FreeTextEntry2] : Wilmer Madrid MD (ref/Pulm) [FreeTextEntry3] : Isael Stern MD, FACS Chief, Division of Thoracic Surgery Director, Minimally Invasive Thoracic Surgery Department of Cardiovascular and Thoracic Surgery [facility] , Cardiovascular and Thoracic Surgery [facility] [employer] [facility] at [facility], [place]

## 2024-05-28 NOTE — HISTORY OF PRESENT ILLNESS
[FreeTextEntry1] : Ms. DEEP HAYWOOD, 89 year old female, former smoker, w/ hx of cervical radiculitis, hypothyroid, kidney stones, OW, plantar fasciitis, small bowel AVMs and subsequent GI bleeds, IBS, UTIs, glaucoma, macular degeneration, essential tremor, basal cell carcinoma of nose. Pt was sent for CT Abd due to bladder issue and incidentally found to have lung nodules. Referred by Dr. Madrid for further evaluation and treatment.   CT Chest on 4/23/24:  - Heavily calcified right anterior and posterior pleural calcifications/plaques which may be related to prior fibrothorax versus asbestos related disease of the pleura - Anterior Right lower lobe solid, non calcified pulmonary nodule abutting the right major fissure; 14mm  - Question central pulmonary nodule measuring up to 16mm (Series 3, Image 16) of the medial Left lower lobe centrally versus confluence of vessels. - Small 4 mm left lower lobe solid pulmonary nodule (Series 303, Image 149)   PFTs on 4/29/24: FVC: 2.11 (98%); FEV1: 1.43 (99%); DLCO: 10.8 (65%)  Patient presents today for CTSx Consultation.  Pt reports asymptomatic, denies fever, chills, SOB, cough, hemoptysis, CP, pain or recent illness.

## 2024-06-05 ENCOUNTER — APPOINTMENT (OUTPATIENT)
Dept: PULMONOLOGY | Facility: CLINIC | Age: 89
End: 2024-06-05
Payer: MEDICARE

## 2024-06-05 VITALS
HEIGHT: 62 IN | SYSTOLIC BLOOD PRESSURE: 130 MMHG | OXYGEN SATURATION: 94 % | HEART RATE: 71 BPM | BODY MASS INDEX: 28.43 KG/M2 | WEIGHT: 154.5 LBS | RESPIRATION RATE: 18 BRPM | TEMPERATURE: 97.2 F | DIASTOLIC BLOOD PRESSURE: 80 MMHG

## 2024-06-05 DIAGNOSIS — R93.89 ABNORMAL FINDINGS ON DIAGNOSTIC IMAGING OF OTHER SPECIFIED BODY STRUCTURES: ICD-10-CM

## 2024-06-05 DIAGNOSIS — Z72.820 SLEEP DEPRIVATION: ICD-10-CM

## 2024-06-05 PROCEDURE — 99214 OFFICE O/P EST MOD 30 MIN: CPT

## 2024-06-05 NOTE — DISCUSSION/SUMMARY
[FreeTextEntry1] : 4/23/2024 CT Chest  - Heavily calcified right anterior and posterior pleural calcifications/plaques which may be related to prior fibrothorax versus asbestos related disease of the pleura - Anterior Right lower lobe solid, non-calcified pulmonary nodule abutting the right major fissure; 14mm - Question central pulmonary nodule measuring up to 16mm of the medial Left lower lobe centrally versus confluence of vessels. - Small 4 mm left lower lobe solid pulmonary nodule  5/3/2024 Nodify pretest nodule calculator risk score for risk of malignancy 36%

## 2024-06-05 NOTE — ASSESSMENT
[FreeTextEntry1] : Ms. DEEP HAYWOOD, 89-year-old, former smoking, female, w/ hx of cervical radiculitis, hypothyroid, kidney stones, OW, plantar fasciitis, small bowel AVMs and subsequent GI bleeds, IBS, UTIs, glaucoma, macular degeneration, essential tremor, basal cell carcinoma of nose. Pt was sent for CT Abd due to bladder issue and incidentally found to have lung nodules.  She presents for follow-up visit. Her chief concern is abnormal CAT scan.  1. Abnormal Chest CT: - Per Visit with Dr. Stern - due for follow up imaging of CT IV contrast 7/30/2024 (patient already scheduled).  - Follow up with Dr. Stern on 8/6/2024.  2. Poor Sleep: - Offered Sleep ring as patient could not tolerate chest band and nasal canula when trying HST. Patient defers at this time.   Patient to follow up with Dr. Madrid as scheduled. Patient to call with further questions and concerns. Patient verbalizes understanding of care plan and is agreeable.

## 2024-06-05 NOTE — HISTORY OF PRESENT ILLNESS
[TextBox_4] : Ms. DEEP HAYWOOD, 89-year-old, former smoking, female, w/ hx of cervical radiculitis, hypothyroid, kidney stones, OW, plantar fasciitis, small bowel AVMs and subsequent GI bleeds, IBS, UTIs, glaucoma, macular degeneration, essential tremor, basal cell carcinoma of nose. Pt was sent for CT Abd due to bladder issue and incidentally found to have lung nodules.  She presents for follow-up visit.  Her chief concern is abnormal CAT scan.  She states she tried the overnight home sleep study, but could not tolerate it.  She states she was unable to sleep with nasal cannula and equipment around her chest.  She notes she does not snore.  She states she initiates sleep at various hours and usually awakes at 3 to 4 AM.  She states she will get up to have breakfast and sit on her laptop until she feels tired again.  She states she will go back to sleep around 6 AM and will sleep usually till 9/10 AM.  She denies fever/chills, decreased appetite, increased fatigue, shortness of breath at rest or exertion, cough, wheezing, or chest tightness.

## 2024-06-11 ENCOUNTER — EMERGENCY (EMERGENCY)
Facility: HOSPITAL | Age: 89
LOS: 1 days | Discharge: HOME | End: 2024-06-11
Attending: EMERGENCY MEDICINE
Payer: MEDICARE

## 2024-06-11 VITALS
DIASTOLIC BLOOD PRESSURE: 75 MMHG | SYSTOLIC BLOOD PRESSURE: 162 MMHG | HEIGHT: 62 IN | OXYGEN SATURATION: 97 % | TEMPERATURE: 99 F | RESPIRATION RATE: 19 BRPM | WEIGHT: 151.9 LBS | HEART RATE: 56 BPM

## 2024-06-11 VITALS
HEART RATE: 61 BPM | DIASTOLIC BLOOD PRESSURE: 66 MMHG | SYSTOLIC BLOOD PRESSURE: 147 MMHG | RESPIRATION RATE: 18 BRPM | OXYGEN SATURATION: 95 % | TEMPERATURE: 98 F

## 2024-06-11 DIAGNOSIS — Z98.890 OTHER SPECIFIED POSTPROCEDURAL STATES: Chronic | ICD-10-CM

## 2024-06-11 LAB
ALBUMIN SERPL ELPH-MCNC: 4.2 G/DL — SIGNIFICANT CHANGE UP (ref 3.3–5)
ALP SERPL-CCNC: 93 U/L — SIGNIFICANT CHANGE UP (ref 40–120)
ALT FLD-CCNC: 16 U/L — SIGNIFICANT CHANGE UP (ref 10–45)
ANION GAP SERPL CALC-SCNC: 11 MMOL/L — SIGNIFICANT CHANGE UP (ref 5–17)
APPEARANCE UR: CLEAR — SIGNIFICANT CHANGE UP
APTT BLD: 36 SEC — HIGH (ref 24.5–35.6)
AST SERPL-CCNC: 20 U/L — SIGNIFICANT CHANGE UP (ref 10–40)
BACTERIA # UR AUTO: NEGATIVE /HPF — SIGNIFICANT CHANGE UP
BASOPHILS # BLD AUTO: 0.03 K/UL — SIGNIFICANT CHANGE UP (ref 0–0.2)
BASOPHILS NFR BLD AUTO: 0.6 % — SIGNIFICANT CHANGE UP (ref 0–2)
BILIRUB SERPL-MCNC: 0.7 MG/DL — SIGNIFICANT CHANGE UP (ref 0.2–1.2)
BILIRUB UR-MCNC: NEGATIVE — SIGNIFICANT CHANGE UP
BUN SERPL-MCNC: 15 MG/DL — SIGNIFICANT CHANGE UP (ref 7–23)
CALCIUM SERPL-MCNC: 9.7 MG/DL — SIGNIFICANT CHANGE UP (ref 8.4–10.5)
CAST: 0 /LPF — SIGNIFICANT CHANGE UP (ref 0–4)
CHLORIDE SERPL-SCNC: 103 MMOL/L — SIGNIFICANT CHANGE UP (ref 96–108)
CO2 SERPL-SCNC: 24 MMOL/L — SIGNIFICANT CHANGE UP (ref 22–31)
COLOR SPEC: YELLOW — SIGNIFICANT CHANGE UP
CREAT SERPL-MCNC: 0.57 MG/DL — SIGNIFICANT CHANGE UP (ref 0.5–1.3)
DIFF PNL FLD: NEGATIVE — SIGNIFICANT CHANGE UP
EGFR: 88 ML/MIN/1.73M2 — SIGNIFICANT CHANGE UP
EOSINOPHIL # BLD AUTO: 0.15 K/UL — SIGNIFICANT CHANGE UP (ref 0–0.5)
EOSINOPHIL NFR BLD AUTO: 2.9 % — SIGNIFICANT CHANGE UP (ref 0–6)
GLUCOSE SERPL-MCNC: 102 MG/DL — HIGH (ref 70–99)
GLUCOSE UR QL: NEGATIVE MG/DL — SIGNIFICANT CHANGE UP
HCT VFR BLD CALC: 39.4 % — SIGNIFICANT CHANGE UP (ref 34.5–45)
HGB BLD-MCNC: 13.3 G/DL — SIGNIFICANT CHANGE UP (ref 11.5–15.5)
IMM GRANULOCYTES NFR BLD AUTO: 0.6 % — SIGNIFICANT CHANGE UP (ref 0–0.9)
INR BLD: 1 RATIO — SIGNIFICANT CHANGE UP (ref 0.85–1.18)
KETONES UR-MCNC: NEGATIVE MG/DL — SIGNIFICANT CHANGE UP
LEUKOCYTE ESTERASE UR-ACNC: NEGATIVE — SIGNIFICANT CHANGE UP
LYMPHOCYTES # BLD AUTO: 1 K/UL — SIGNIFICANT CHANGE UP (ref 1–3.3)
LYMPHOCYTES # BLD AUTO: 19.2 % — SIGNIFICANT CHANGE UP (ref 13–44)
MCHC RBC-ENTMCNC: 31.3 PG — SIGNIFICANT CHANGE UP (ref 27–34)
MCHC RBC-ENTMCNC: 33.8 GM/DL — SIGNIFICANT CHANGE UP (ref 32–36)
MCV RBC AUTO: 92.7 FL — SIGNIFICANT CHANGE UP (ref 80–100)
MONOCYTES # BLD AUTO: 0.44 K/UL — SIGNIFICANT CHANGE UP (ref 0–0.9)
MONOCYTES NFR BLD AUTO: 8.5 % — SIGNIFICANT CHANGE UP (ref 2–14)
NEUTROPHILS # BLD AUTO: 3.55 K/UL — SIGNIFICANT CHANGE UP (ref 1.8–7.4)
NEUTROPHILS NFR BLD AUTO: 68.2 % — SIGNIFICANT CHANGE UP (ref 43–77)
NITRITE UR-MCNC: NEGATIVE — SIGNIFICANT CHANGE UP
NRBC # BLD: 0 /100 WBCS — SIGNIFICANT CHANGE UP (ref 0–0)
PH UR: 7 — SIGNIFICANT CHANGE UP (ref 5–8)
PLATELET # BLD AUTO: 277 K/UL — SIGNIFICANT CHANGE UP (ref 150–400)
POTASSIUM SERPL-MCNC: 4.2 MMOL/L — SIGNIFICANT CHANGE UP (ref 3.5–5.3)
POTASSIUM SERPL-SCNC: 4.2 MMOL/L — SIGNIFICANT CHANGE UP (ref 3.5–5.3)
PROT SERPL-MCNC: 7.3 G/DL — SIGNIFICANT CHANGE UP (ref 6–8.3)
PROT UR-MCNC: NEGATIVE MG/DL — SIGNIFICANT CHANGE UP
PROTHROM AB SERPL-ACNC: 11 SEC — SIGNIFICANT CHANGE UP (ref 9.5–13)
RBC # BLD: 4.25 M/UL — SIGNIFICANT CHANGE UP (ref 3.8–5.2)
RBC # FLD: 13.2 % — SIGNIFICANT CHANGE UP (ref 10.3–14.5)
RBC CASTS # UR COMP ASSIST: 0 /HPF — SIGNIFICANT CHANGE UP (ref 0–4)
SODIUM SERPL-SCNC: 138 MMOL/L — SIGNIFICANT CHANGE UP (ref 135–145)
SP GR SPEC: 1.02 — SIGNIFICANT CHANGE UP (ref 1–1.03)
SQUAMOUS # UR AUTO: 1 /HPF — SIGNIFICANT CHANGE UP (ref 0–5)
TROPONIN T, HIGH SENSITIVITY RESULT: 8 NG/L — SIGNIFICANT CHANGE UP (ref 0–51)
TROPONIN T, HIGH SENSITIVITY RESULT: 9 NG/L — SIGNIFICANT CHANGE UP (ref 0–51)
UROBILINOGEN FLD QL: 0.2 MG/DL — SIGNIFICANT CHANGE UP (ref 0.2–1)
WBC # BLD: 5.2 K/UL — SIGNIFICANT CHANGE UP (ref 3.8–10.5)
WBC # FLD AUTO: 5.2 K/UL — SIGNIFICANT CHANGE UP (ref 3.8–10.5)
WBC UR QL: 1 /HPF — SIGNIFICANT CHANGE UP (ref 0–5)

## 2024-06-11 PROCEDURE — 99285 EMERGENCY DEPT VISIT HI MDM: CPT

## 2024-06-11 PROCEDURE — 82962 GLUCOSE BLOOD TEST: CPT

## 2024-06-11 PROCEDURE — 70450 CT HEAD/BRAIN W/O DYE: CPT | Mod: 26,59,MC

## 2024-06-11 PROCEDURE — 82330 ASSAY OF CALCIUM: CPT

## 2024-06-11 PROCEDURE — 83605 ASSAY OF LACTIC ACID: CPT

## 2024-06-11 PROCEDURE — 80053 COMPREHEN METABOLIC PANEL: CPT

## 2024-06-11 PROCEDURE — 0042T: CPT | Mod: MC

## 2024-06-11 PROCEDURE — 82803 BLOOD GASES ANY COMBINATION: CPT

## 2024-06-11 PROCEDURE — 70496 CT ANGIOGRAPHY HEAD: CPT | Mod: 26,MC

## 2024-06-11 PROCEDURE — 85730 THROMBOPLASTIN TIME PARTIAL: CPT

## 2024-06-11 PROCEDURE — 71045 X-RAY EXAM CHEST 1 VIEW: CPT | Mod: 26

## 2024-06-11 PROCEDURE — 87086 URINE CULTURE/COLONY COUNT: CPT

## 2024-06-11 PROCEDURE — 84295 ASSAY OF SERUM SODIUM: CPT

## 2024-06-11 PROCEDURE — 85014 HEMATOCRIT: CPT

## 2024-06-11 PROCEDURE — 85018 HEMOGLOBIN: CPT

## 2024-06-11 PROCEDURE — 99285 EMERGENCY DEPT VISIT HI MDM: CPT | Mod: 25

## 2024-06-11 PROCEDURE — 71045 X-RAY EXAM CHEST 1 VIEW: CPT

## 2024-06-11 PROCEDURE — 85610 PROTHROMBIN TIME: CPT

## 2024-06-11 PROCEDURE — 70450 CT HEAD/BRAIN W/O DYE: CPT | Mod: MC

## 2024-06-11 PROCEDURE — 70498 CT ANGIOGRAPHY NECK: CPT | Mod: MC

## 2024-06-11 PROCEDURE — 85025 COMPLETE CBC W/AUTO DIFF WBC: CPT

## 2024-06-11 PROCEDURE — 93005 ELECTROCARDIOGRAM TRACING: CPT

## 2024-06-11 PROCEDURE — 84132 ASSAY OF SERUM POTASSIUM: CPT

## 2024-06-11 PROCEDURE — 82947 ASSAY GLUCOSE BLOOD QUANT: CPT

## 2024-06-11 PROCEDURE — 70496 CT ANGIOGRAPHY HEAD: CPT | Mod: MC

## 2024-06-11 PROCEDURE — 81001 URINALYSIS AUTO W/SCOPE: CPT

## 2024-06-11 PROCEDURE — 84484 ASSAY OF TROPONIN QUANT: CPT

## 2024-06-11 PROCEDURE — 82435 ASSAY OF BLOOD CHLORIDE: CPT

## 2024-06-11 PROCEDURE — 70498 CT ANGIOGRAPHY NECK: CPT | Mod: 26,MC

## 2024-06-11 NOTE — ED PROVIDER NOTE - PATIENT PORTAL LINK FT
You can access the FollowMyHealth Patient Portal offered by [facility] by registering at the following website: [email or web address]. By joining [employer]’s FollowMyHealth portal, you will also be able to view your health information using other applications (apps) compatible with our system.

## 2024-06-11 NOTE — ED PROVIDER NOTE - OBJECTIVE STATEMENT
89-year-old female PMHx hypothyroid, kidney stones, small bowel AVMs and subsequent GI bleeds, glaucoma, macular degeneration, basal cell carcinoma of nose now presenting to the ED for RLE weakness since 130pm today. Patient reported she was in the kitchen when she felt her right leg become weak like her knee gave out and then felt dizziness near syncopal as if she was going to fall over. Patient has had recurrent falls over last few weeks. Patient denied CP, SOB, slurred speech, weakness in extremity, blurry or double vision, facial droop

## 2024-06-11 NOTE — ED PROVIDER NOTE - ATTENDING APP SHARED VISIT CONTRIBUTION OF CARE
Attending MD Galvan:   I personally have seen and examined this patient.  Physician assistant note reviewed and agree on plan of care and except where noted.  See below for details.     seen in [facility], accompanied by proxy (friend)    89F with PMH/PSH including hypothyroidism, nephrolithiasis, small bowel AVM, GIB, macular degeneration, basal cell carcinoma of nose presents to the ED with RLE weakness, dizziness.  Reports that since this AM felt weak.  Reports at around 13:30 was in kitchen when had RLE weakness, felt as if leg was going to give out, reports associated dizziness and near syncope.  Reports dizziness and LE weakness has resolved.  Denies chest pain, shortness of breath, abdominal pain, nausea, vomiting, diarrhea, urinary complaints.  Denies new change in vision, double vision, sudden loss of vision, headache.  Proxy reports that patient is very independent and walks  unassisted although she (proxy) bought her (patient) a cane for PRN use.  Reports has had multiple falls resulting in UEs fractures.  Denies difficulty speaking.  Denies recent illness, URI symptoms, fevers, chills.    Exam:   General: NAD  HENT: head NCAT, airway patent  Eyes: anicteric, no conjunctival injection   Lungs: lungs CTAB with good inspiratory effort, no wheezing, no rhonchi, no rales  Cardiac: +S1S2, no obvious m/r/g  GI: abdomen soft with +BS, NT, ND  : no CVAT  MSK: ranging neck and extremities freely  Neuro: moving all extremities spontaneously with 5/5 strength, CN 2-12 grossly intact, no saddle anesthesia, nonfocal  Psych: normal mood and affect     A/P: 89F with dizziness and RLE weakness, [employer] called, will obtain neuro consults, CTs, will also evaluate for metabolic derangement, will eval for cardiac etiology such as ACS, arrhythmia, will obtain trop, EKG, keep on monitor, will also use CXR and UA to eval for occult infection

## 2024-06-11 NOTE — CONSULT NOTE ADULT - ASSESSMENT
Pt is an 89 year old female with a history of macular degeneration, AVM in her stomach, and tremors in her hands presenting with lightheadedness and right-sided weakness in her arm and leg beginning this afternoon which resolved after 1 hour. Patient on arrival had a non-focal neurologic exam and was able to walk without difficulty. CTH was without acute infarct or hemorrhage. 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 Bilateral fetal PCAs.       NIHSS: 0  LKN: 1330 Pm on 6/11  Pre PRS: 2    No thrombectomy as no LVO was identified  No tenecteplase as symptoms had resolved on arrival.     Impression: Transient R sided weakness and lightheadedness. R sided weakness may be secondary to a transient ischemic event, lightheadedness likely due to hypotension/cardiac etiology. ABCD2 score 6 (age, duration of symptoms, blood pressure elevation and unilateral weakness)    Recommendations:  [] Continue ASA 81 mg daily  [] Please check HbA1c and lipid panel  [] Outpatient follow-up with Dr Nissenbaum (scheduled for this thursday 6/13)  [] MR brain wo contrast can be considered in the outpatient setting  [] Continue PT in the outpatient setting as before  [] Patient does not need inpatient neurologic care at this time      Case discussed with Dr Desir (stroke fellow) Pt is an 89 year old female with a history of macular degeneration, AVM in her stomach, and tremors in her hands presenting with lightheadedness and right-sided weakness in her arm and leg beginning this afternoon which resolved after 1 hour. Patient on arrival had a non-focal neurologic exam and was able to walk without difficulty. CTH was without acute infarct or hemorrhage. 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 Bilateral fetal PCAs.       NIHSS: 0  LKN: 1330 Pm on 6/11  Pre PRS: 2    No thrombectomy as no LVO was identified  No tenecteplase as symptoms had resolved on arrival.     Impression: Transient R sided weakness and lightheadedness. R sided weakness may be secondary to a transient ischemic event, lightheadedness likely due to hypotension/cardiac etiology. ABCD2 score 6 (age, duration of symptoms, blood pressure elevation and unilateral weakness)    Recommendations:  [] Continue ASA 81 mg daily  [] Please check HbA1c and lipid panel  [] Atorvastatin 40 mg daily, LDL goal <70  [] Outpatient follow-up with Dr Nissenbaum (scheduled for this thursday 6/13)  [] MR brain wo contrast can be considered in the outpatient setting  [] Continue PT in the outpatient setting as before  [] Patient does not need inpatient neurologic care at this time      Case discussed with Dr Desir (stroke fellow)

## 2024-06-11 NOTE — ED PROVIDER NOTE - CARE PROVIDER_API CALL
Emile Connelly  Cardiovascular Disease  [address] [place] [address], [facility] [address]  [place], [place] [id number]  Phone: [phone]  Fax: [phone]  Established Patient  Follow Up Time: 1-3 Days

## 2024-06-11 NOTE — CONSULT NOTE ADULT - SUBJECTIVE AND OBJECTIVE BOX
Neurology - Consult Note    Spectra: [phone] ([facility]), [phone] ([facility])    HPI:      Review of Systems:  INCOMPLETE   CONSTITUTIONAL: No fevers or chills  EYES AND ENT: No visual changes or no throat pain   NECK: No pain or stiffness  RESPIRATORY: No shortness of breath  CARDIOVASCULAR: No chest pain or palpitations  GASTROINTESTINAL: No nausea or vomiting   GENITOURINARY: No dysuria  NEUROLOGICAL: +As stated in HPI above  SKIN: No itching, burning, rashes, or lesions   All other review of systems is negative unless indicated above.    Allergies:  No Known Allergies      PMHx/PSHx/Family Hx: As above, otherwise see below   Hypothyroidism    Hypothyroidism    IBS (Irritable Bowel Syndrome)    Proctitis    Shingles    AVM (arteriovenous malformation)    Renal stone    Tremors of nervous system    H/O basal cell carcinoma excision    Sinus infection    Macular degeneration of left eye    Glaucoma    COVID-19 vaccine series completed    Closed fracture of left elbow        Social Hx:  Never smoker; no current use of tobacco, alcohol, or illicit drugs  Lives with ***; occupation ***, baseline functional status is ***    Medications:  MEDICATIONS  (STANDING):    MEDICATIONS  (PRN):      Vitals:  T(C): 37 (06-11-24 @ 15:23), Max: 37 (06-11-24 @ 15:23)  HR: 56 (06-11-24 @ 15:23) (56 - 56)  BP: 162/75 (06-11-24 @ 15:23) (162/75 - 162/75)  RR: 19 (06-11-24 @ 15:23) (19 - 19)  SpO2: 97% (06-11-24 @ 15:23) (97% - 97%)    Physical Examination: INCOMPLETE  General - non-toxic appearing male/female in no acute distress  Cardiovascular - peripheral pulses palpable, no edema  Respiratory - breathing comfortably with no increased work of breathing    Neurologic Exam:  Mental status - Awake, Alert, Oriented to person, place, and time. Speech fluent, repetition and naming intact. Follows simple and complex commands. Attention/concentration, recent and remote memory (including registration 3/3 and recall 3/3), and fund of knowledge intact    Cranial nerves - PERRLA (4mm -> 3mm b/l), VFF, EOMI, face sensation (V1-V3) intact b/l, facial strength intact without asymmetry b/l, hearing intact b/l, palate with symmetric elevation, trapezius OR sternocleidomastiod 5/5 strength b/l, tongue midline on protrusion with full lateral movement    Motor - Normal bulk and tone throughout. No pronator drift.    Strength testing            Deltoid      Biceps      Triceps     Wrist Extension    Wrist Flexion     Interossei         R            5                 5               5                     5                              5                        5                 5  L             5                 5               5                     5                              5                        5                 5              Hip Flexion    Hip Extension    Knee Flexion    Knee Extension    Dorsiflexion    Plantar Flexion  R              5                         5                       5                           5                            5                          5  L              5                         5                        5                           5                            5                          5    Sensation - Light touch/temperature OR pain/vibration intact throughout    DTR's -             Biceps      Triceps     Brachioradialis      Patellar    Ankle    Toes/plantar response  R             2+             2+                  2+                       2+            2+                 Down  L              2+             2+                 2+                        2+           2+                 Down    Coordination - Finger to Nose intact b/l. No tremors appreciated    Gait and station - Normal casual gait. Romberg (-)    Labs:                        13.3   5.20  )-----------( 277      ( 11 Jun 2024 17:02 )             39.4           CAPILLARY BLOOD GLUCOSE      POCT Blood Glucose.: 102 mg/dL (11 Jun 2024 16:43)        PT/INR - ( 11 Jun 2024 17:02 )   PT: 11.0 sec;   INR: 1.00 ratio         PTT - ( 11 Jun 2024 17:02 )  PTT:36.0 sec  CSF:                  Radiology:     Neurology - Consult Note    Spectra: [phone] ([facility]), [phone] ([facility])    HPI: TATE is an 89 year old female with a history of macular degeneration, AVM in her stomach, and tremors in her hands presenting with lightheadedness and right-sided weakness in her arm and leg beginning this afternoon (LKN 1:30pm). While doing her laundry she began to feel lightheaded and felt like her right arm and right leg became weak. The episode lasted for about one hour and her symptoms have since resolved. She did not have any confusion, nausea, vomiting, changes in her vision, or difficulty speaking or swallowing. She did not fall or lose consciousness. She called her friend who stated she did not have any slurred speech on the phone and she was able to walk downstairs without help. Per her friend/health care proxy who brought her to the hospital, TATE lives alone independently. She drives and manages her own finances. She does not have any history of stroke, HTN, or diabetes, or migraines. She has fallen several times in the past few years and broke her arm twice (currently wearing right wrist brace), and is in PT for gait/balance. She has had episodes of lightheadedness in the past. She also described feeling a sharp pain in her head that only lasts for a few seconds that has occurred a couple of times per week for the past several years. During these episodes she does not have any vision changes or other associated symptoms. She has an outpatient neurology appointment on Friday with Dr. Nissenbaum to discuss this head pain. She is no longer feeling lightheaded or weak.       Review of Systems:  INCOMPLETE   CONSTITUTIONAL: No fevers or chills  EYES AND ENT: No visual changes or no throat pain   NECK: No pain or stiffness  RESPIRATORY: No shortness of breath  CARDIOVASCULAR: No chest pain or palpitations  GASTROINTESTINAL: No nausea or vomiting   GENITOURINARY: No dysuria  NEUROLOGICAL: +As stated in HPI above  SKIN: No itching, burning, rashes, or lesions   All other review of systems is negative unless indicated above.    Allergies:  No Known Allergies      PMHx/PSHx/Family Hx: As above, otherwise see below   Hypothyroidism    Hypothyroidism    IBS (Irritable Bowel Syndrome)    Proctitis    Shingles    AVM (arteriovenous malformation)    Renal stone    Tremors of nervous system    H/O basal cell carcinoma excision    Sinus infection    Macular degeneration of left eye    Glaucoma    COVID-19 vaccine series completed    Closed fracture of left elbow        Social Hx:  Never smoker; no current use of tobacco, alcohol, or illicit drugs  Lives with ***; occupation ***, baseline functional status is ***    Medications:  MEDICATIONS  (STANDING):    MEDICATIONS  (PRN):      Vitals:  T(C): 37 (06-11-24 @ 15:23), Max: 37 (06-11-24 @ 15:23)  HR: 56 (06-11-24 @ 15:23) (56 - 56)  BP: 162/75 (06-11-24 @ 15:23) (162/75 - 162/75)  RR: 19 (06-11-24 @ 15:23) (19 - 19)  SpO2: 97% (06-11-24 @ 15:23) (97% - 97%)    Physical Examination: INCOMPLETE  General - non-toxic appearing male/female in no acute distress  Cardiovascular - peripheral pulses palpable, no edema  Respiratory - breathing comfortably with no increased work of breathing    Neurologic Exam:  Mental status - Awake, Alert, Oriented to person, place, and time. Speech fluent, repetition and naming intact. Follows simple and complex commands. Attention/concentration, recent and remote memory (including registration 3/3 and recall 3/3), and fund of knowledge intact    Cranial nerves - PERRLA (4mm -> 3mm b/l), VFF, EOMI, face sensation (V1-V3) intact b/l, facial strength intact without asymmetry b/l, hearing intact b/l, palate with symmetric elevation, trapezius OR sternocleidomastiod 5/5 strength b/l, tongue midline on protrusion with full lateral movement    Motor - Normal bulk and tone throughout. No pronator drift.    Strength testing            Deltoid      Biceps      Triceps     Wrist Extension    Wrist Flexion     Interossei         R            5                 5               5                     5                              5                        5                 5  L             5                 5               5                     5                              5                        5                 5              Hip Flexion    Hip Extension    Knee Flexion    Knee Extension    Dorsiflexion    Plantar Flexion  R              5                         5                       5                           5                            5                          5  L              5                         5                        5                           5                            5                          5    Sensation - Light touch/temperature OR pain/vibration intact throughout    DTR's -             Biceps      Triceps     Brachioradialis      Patellar    Ankle    Toes/plantar response  R             2+             2+                  2+                       2+            2+                 Down  L              2+             2+                 2+                        2+           2+                 Down    Coordination - Finger to Nose intact b/l. No tremors appreciated    Gait and station - Normal casual gait. Romberg (-)    Labs:                        13.3   5.20  )-----------( 277      ( 11 Jun 2024 17:02 )             39.4           CAPILLARY BLOOD GLUCOSE      POCT Blood Glucose.: 102 mg/dL (11 Jun 2024 16:43)        PT/INR - ( 11 Jun 2024 17:02 )   PT: 11.0 sec;   INR: 1.00 ratio         PTT - ( 11 Jun 2024 17:02 )  PTT:36.0 sec  CSF:                  Radiology:     Neurology - Consult Note    Spectra: [phone] ([facility]), [phone] ([facility])    HPI: Pt is an 89 year old female with a history of macular degeneration, AVM in her stomach, and tremors in her hands presenting with lightheadedness and right-sided weakness in her arm and leg beginning this afternoon (LKN 1:30pm). While doing her laundry she began to feel lightheaded and felt like her right arm and right leg became weak. The episode lasted for about one hour and her symptoms have since resolved. She did not have any confusion, nausea, vomiting, changes in her vision, or difficulty speaking or swallowing. She did not fall or lose consciousness. She called her friend who stated she did not have any slurred speech on the phone and she was able to walk downstairs without help. Per her friend/health care proxy who brought her to the hospital, she lives alone independently. She drives and manages her own finances. She does not have any history of stroke, HTN, or diabetes, or migraines. She has fallen several times in the past few years and broke her arm twice (currently wearing right wrist brace), and is in PT for gait/balance. She has had episodes of lightheadedness in the past. She also described feeling a sharp pain in her head that only lasts for a few seconds that has occurred a couple of times per week for the past several years. During these episodes she does not have any vision changes or other associated symptoms.     She has an outpatient neurology appointment on Friday with Dr. Nissenbaum to discuss this head pain. She is no longer feeling lightheaded or weak. Patient states she sometimes needs to use a cane to walk but mostly walks unassisted.       Review of Systems:    CONSTITUTIONAL: No fevers or chills  EYES AND ENT: No visual changes or no throat pain   NECK: No pain or stiffness  RESPIRATORY: No shortness of breath  CARDIOVASCULAR: No chest pain or palpitations  GASTROINTESTINAL: No nausea or vomiting   GENITOURINARY: No dysuria  NEUROLOGICAL: +As stated in HPI above  SKIN: No itching, burning, rashes, or lesions   All other review of systems is negative unless indicated above.    Allergies:  No Known Allergies      PMHx/PSHx/Family Hx: As above, otherwise see below   Hypothyroidism    Hypothyroidism    IBS (Irritable Bowel Syndrome)    Proctitis    Shingles    AVM (arteriovenous malformation)    Renal stone    Tremors of nervous system    H/O basal cell carcinoma excision    Sinus infection    Macular degeneration of left eye    Glaucoma    COVID-19 vaccine series completed    Closed fracture of left elbow        Social Hx:  Never smoker; no current use of tobacco, alcohol, or illicit drugs  Lives alone; occupation- not currently not employed, baseline functional status is independent with ADLs    Medications:  MEDICATIONS  (STANDING):    MEDICATIONS  (PRN):      Vitals:  T(C): 37 (06-11-24 @ 15:23), Max: 37 (06-11-24 @ 15:23)  HR: 56 (06-11-24 @ 15:23) (56 - 56)  BP: 162/75 (06-11-24 @ 15:23) (162/75 - 162/75)  RR: 19 (06-11-24 @ 15:23) (19 - 19)  SpO2: 97% (06-11-24 @ 15:23) (97% - 97%)    Physical Examination:   General - non-toxic appearing female in no acute distress  Cardiovascular - peripheral pulses palpable, no edema  Respiratory - breathing comfortably with no increased work of breathing    Neurologic Exam:  Mental status - Awake, Alert, Oriented to person, place, and time. Speech fluent, repetition and naming intact. Follows simple and complex commands. Attention/concentration, recent and remote memory (including registration 3/3 and recall 3/3), and fund of knowledge intact    Cranial nerves - PERRLA (4mm -> 3mm b/l), VFF, EOMI, face sensation (V1-V3) intact b/l, facial strength intact without asymmetry b/l, hearing intact b/l, palate with symmetric elevation, tongue midline on protrusion with full lateral movement    Motor - Normal bulk and tone throughout. No pronator drift.    Strength testing            Deltoid      Biceps      Triceps     Wrist Extension    Wrist Flexion     Interossei         R            5                 5               5                     5                              5                        5                 5  L             5                 5               5                     5                              5                        5                 5              Hip Flexion    Hip Extension    Knee Flexion    Knee Extension    Dorsiflexion    Plantar Flexion  R              5                         5                       5                           5                            5                          5  L              5                         5                        5                           5                            5                          5    Sensation - Light touch/temperature OR pain/vibration intact throughout    DTR's -             Biceps      Triceps     Brachioradialis      Patellar    Ankle    Toes/plantar response  R             2+             2+                  2+                       2+            2+                 Down  L              2+             2+                 2+                        2+           2+                 Down    Coordination - Finger to Nose intact b/l. No tremors appreciated    Gait and station - Normal casual gait. Walks cautiously but appropriately     Labs:                        13.3   5.20  )-----------( 277      ( 11 Jun 2024 17:02 )             39.4           CAPILLARY BLOOD GLUCOSE      POCT Blood Glucose.: 102 mg/dL (11 Jun 2024 16:43)        PT/INR - ( 11 Jun 2024 17:02 )   PT: 11.0 sec;   INR: 1.00 ratio         PTT - ( 11 Jun 2024 17:02 )  PTT:36.0 sec      Radiology:    CT head:  No acute infarct or hemorrhage      CT PERFUSION:  No deficits reported.    CTA NECK:  No arterial steno-occlusive disease    CTA HEAD:  No arterial steno-occlusive disease or aneurysm.  bilateral hypoplastic PCAs R>L. Bilateral fetal PCAs.

## 2024-06-11 NOTE — ED PROVIDER NOTE - NSFOLLOWUPINSTRUCTIONS_ED_ALL_ED_FT
YOU WERE SEEN IN THE ED FOR: weakness, with resolved dizziness    WHILE YOU WERE HERE, YOU HAD: labs, CT scans. Your test results did not reveal any concerning abnormalities.  It is not entirely clear what is causing your symptoms at this time however as discussed your heart rate here was 51.  Please follow up with your PMD/Cardiologist Dr. Connelly in 1-3 days in regards to this matter.    BRING COPIES OF YOUR RESULTS.    RETURN TO THE EMERGENCY DEPARTMENT IF YOU EXPERIENCE ANY NEW/CONCERNING/WORSENING SYMPTOMS SUCH AS BUT NOT LIMITED TO: chest pain, shortness of breath, severe abdominal pain or back pain, persistent vomiting, loss of urinary or bowel continence, difficulty urinating (changes in bowel or bladder control), numbness, weakness or tingling in extremities (arms or legs), severe neck pain, increasing pain in any area of your body, blood in your urine, stool, or vomit, severe headache, facial droop, difficulty speaking, sudden vision loss or double vision, or any other concern.

## 2024-06-11 NOTE — ED PROVIDER NOTE - PROGRESS NOTE DETAILS
PRAKASH Barragan: code stroke called 1637 Attending MD Galvan: Spoke with neuro stroke resident, no additional testing or intervention.  Reports patient has outpatient appointment with Dr. Nissenbaum and may follow up at that time with outpatient neuro for decision for further imaging such as MRI or further work up.  Rest as per Emergency Department team. Attending MD Galvan: Patient re-evaluated and feeling improved.  No acute issues at  this time.  Lab and radiology tests reviewed with patient and proxy, Nevin.  Previous EKG HR 60.  Reports has had previous heart monitor but reports there was an issue in interpreting it after she wore it.  Advised she would need to return to Dr. Connelly in 1-3 days.  Verbalized understanding.  Patient stable for discharge. Follow up instructions given, importance of follow up emphasized, return to ED parameters reviewed and patient verbalized understanding.  All questions answered, all concerns addressed.

## 2024-06-11 NOTE — ED PROVIDER NOTE - PSYCHIATRIC NEGATIVE STATEMENT, MLM
Payor: [address] / Plan: [facility] CHOICE PLUS POS / Product Type: POS /     Subjective:  HPI:  42year old female who has a past medical history of Endometritis (12/2013);  Extrinsic asthma, unspecified; H/O postpartum hemorrhage, currently pregnant; only? no  17.  Do you suffer from bronchitis? no    Functional status:   Can participate in strenuous sports such as swimming, singles tennis, football, basketball, and skiing (>10 METs)    ROS: Denies fever/chills, sleep problems, HA, vision changes, dizzi facility-administered medications on file prior to visit.      Aspirin                 SWELLING    OBJECTIVE:     08/13/18  1338   BP: 106/58   Pulse: 70   Resp: 16   Temp: 99.1 °F (37.3 °C)   TempSrc: Oral   Weight: 115 lb   Height: 63\"     Body mass inde thromboembolic events when they undergo intermediate- or high-risk surgeries. Renal: No hx of renal disorder. Normal Cr on BMP. All pertinent previous records reviewed prior to and during visit.     Marissa Schneider DO no known mental health issues.

## 2024-06-13 LAB
CULTURE RESULTS: SIGNIFICANT CHANGE UP
SPECIMEN SOURCE: SIGNIFICANT CHANGE UP

## 2024-07-30 ENCOUNTER — APPOINTMENT (OUTPATIENT)
Dept: CT IMAGING | Facility: IMAGING CENTER | Age: 89
End: 2024-07-30
Payer: MEDICARE

## 2024-07-30 ENCOUNTER — OUTPATIENT (OUTPATIENT)
Dept: OUTPATIENT SERVICES | Facility: HOSPITAL | Age: 89
LOS: 1 days | End: 2024-07-30
Payer: MEDICARE

## 2024-07-30 DIAGNOSIS — Z98.890 OTHER SPECIFIED POSTPROCEDURAL STATES: Chronic | ICD-10-CM

## 2024-07-30 DIAGNOSIS — R91.1 SOLITARY PULMONARY NODULE: ICD-10-CM

## 2024-07-30 PROCEDURE — 71260 CT THORAX DX C+: CPT | Mod: 26

## 2024-07-30 PROCEDURE — 71260 CT THORAX DX C+: CPT

## 2024-07-31 PROBLEM — R91.1 PULMONARY NODULE, LEFT: Status: ACTIVE | Noted: 2024-07-31

## 2024-08-06 ENCOUNTER — APPOINTMENT (OUTPATIENT)
Dept: THORACIC SURGERY | Facility: CLINIC | Age: 89
End: 2024-08-06

## 2024-08-06 PROCEDURE — 99214 OFFICE O/P EST MOD 30 MIN: CPT

## 2024-08-06 NOTE — HISTORY OF PRESENT ILLNESS
[FreeTextEntry1] : Ms. DEEP HAYWOOD, 89 year old female, former smoker, w/ hx of cervical radiculitis, hypothyroid, kidney stones, OW, plantar fasciitis, small bowel AVMs and subsequent GI bleeds, IBS, UTIs, glaucoma, macular degeneration, essential tremor, basal cell carcinoma of nose. Pt was sent for CT Abd due to bladder issue and incidentally found to have lung nodules. Referred by Dr. Madrid for further evaluation and treatment.   CT Chest on 4/23/24:  - Heavily calcified right anterior and posterior pleural calcifications/plaques which may be related to prior fibrothorax versus asbestos related disease of the pleura - Anterior Right lower lobe solid, non calcified pulmonary nodule abutting the right major fissure; 14mm  - Question central pulmonary nodule measuring up to 16mm (Series 3, Image 16) of the medial Left lower lobe centrally versus confluence of vessels. - Small 4 mm left lower lobe solid pulmonary nodule (Series 303, Image 149)   PFTs on 4/29/24: FVC: 2.11 (98%); FEV1: 1.43 (99%); DLCO: 10.8 (65%)  CT Chest on 07/30/2024: - 1.4 cm right upper lobe nodule ill-defined borders. Right-sided calcified pleural plaques.  Depression screening completed on 08/06/2024.   Patient presents today for follow up. Patient denies shortness of breath, cough, chest pain, fever, chills. c/o phlegm in the morning.

## 2024-08-06 NOTE — ASSESSMENT
[FreeTextEntry1] : Ms. DEEP HAYWOOD, 89 year old female, former smoker, w/ hx of cervical radiculitis, hypothyroid, kidney stones, OW, plantar fasciitis, small bowel AVMs and subsequent GI bleeds, IBS, UTIs, glaucoma, macular degeneration, essential tremor, basal cell carcinoma of nose. Pt was sent for CT Abd due to bladder issue and incidentally found to have lung nodules. Referred by Dr. Madrid for further evaluation and treatment.   Initially consulted in May 2024. Currently under active surveillance.   I have reviewed the patient's medical records and diagnostic images at time of this office consultation and have made the following recommendation: 1. CT chest reviewed and explained to patient, a new RUL noted on most recent scan, with unknown etiology and worrisome, I suggested further evaluated with PET/CT and refers to IR for a CT guided biopsy of RUL nodule if amenable. However, patient is hesitant for any invasive procedure given her age. We discussed at least a consultation with IR to discuss possible procedure for diagnostic purpose. Patient would like to think about and call back for her decision. I discussed to call back on 08/09/2024 for her decision. She verbalized understanding.   I, SHAZIA RizzoIM, personally performed the evaluation and management (E/M) services for this established patient who follow up today with an existing condition.  That E/M includes conducting the examination, assessing all new/exacerbated/existing conditions, and establishing a plan of care.  Today, my ACP, ORA Moulton, was here to observe my evaluation and management services for this existing condition to be followed going forward.

## 2024-08-06 NOTE — CONSULT LETTER
[Dear  ___] : Dear  [unfilled], [Consult Letter:] : I had the pleasure of evaluating your patient, [unfilled]. [Please see my note below.] : Please see my note below. [Consult Closing:] : Thank you very much for allowing me to participate in the care of this patient.  If you have any questions, please do not hesitate to contact me. [Sincerely,] : Sincerely, [FreeTextEntry2] : Wilmer Madrid MD (ref/Pulm) [FreeTextEntry3] : Isael Stern MD, FACS Chief, Division of Thoracic Surgery Director, Minimally Invasive Thoracic Surgery Department of Cardiovascular and Thoracic Surgery [facility] , Cardiovascular and Thoracic Surgery [facility] at [facility]

## 2024-08-06 NOTE — CONSULT LETTER
[Dear  ___] : Dear  [unfilled], [Consult Letter:] : I had the pleasure of evaluating your patient, [unfilled]. [Please see my note below.] : Please see my note below. [Consult Closing:] : Thank you very much for allowing me to participate in the care of this patient.  If you have any questions, please do not hesitate to contact me. [Sincerely,] : Sincerely, [FreeTextEntry2] : Wilmer Madrid MD (ref/Pulm) [FreeTextEntry3] : Isael Stern MD, FACS Chief, Division of Thoracic Surgery Director, Minimally Invasive Thoracic Surgery Department of Cardiovascular and Thoracic Surgery [facility] , Cardiovascular and Thoracic Surgery [facility] at [facility]

## 2024-08-17 ENCOUNTER — APPOINTMENT (OUTPATIENT)
Dept: NUCLEAR MEDICINE | Facility: IMAGING CENTER | Age: 89
End: 2024-08-17
Payer: MEDICARE

## 2024-08-17 PROCEDURE — 78815 PET IMAGE W/CT SKULL-THIGH: CPT | Mod: 26,PI

## 2024-08-20 ENCOUNTER — APPOINTMENT (OUTPATIENT)
Dept: THORACIC SURGERY | Facility: CLINIC | Age: 89
End: 2024-08-20
Payer: MEDICARE

## 2024-08-20 VITALS
SYSTOLIC BLOOD PRESSURE: 156 MMHG | HEIGHT: 62 IN | RESPIRATION RATE: 17 BRPM | BODY MASS INDEX: 27.42 KG/M2 | DIASTOLIC BLOOD PRESSURE: 73 MMHG | HEART RATE: 70 BPM | WEIGHT: 149 LBS | OXYGEN SATURATION: 92 %

## 2024-08-20 DIAGNOSIS — R91.1 SOLITARY PULMONARY NODULE: ICD-10-CM

## 2024-08-20 PROCEDURE — 99214 OFFICE O/P EST MOD 30 MIN: CPT

## 2024-08-20 NOTE — HISTORY OF PRESENT ILLNESS
[FreeTextEntry1] : Ms. DEEP HAYWOOD, 89 year old female, former smoker, w/ hx of cervical radiculitis, hypothyroid, kidney stones, OW, plantar fasciitis, small bowel AVMs and subsequent GI bleeds, IBS, UTIs, glaucoma, macular degeneration, essential tremor, basal cell carcinoma of nose. Pt was sent for CT Abd due to bladder issue and incidentally found to have lung nodules. Referred by Dr. Madrid for further evaluation and treatment.  CT Chest on 4/23/24: - Heavily calcified right anterior and posterior pleural calcifications/plaques which may be related to prior fibrothorax versus asbestos related disease of the pleura - Anterior Right lower lobe solid, non calcified pulmonary nodule abutting the right major fissure; 14mm - Question central pulmonary nodule measuring up to 16mm (Series 3, Image 16) of the medial Left lower lobe centrally versus confluence of vessels. - Small 4 mm left lower lobe solid pulmonary nodule (Series 303, Image 149)  PFTs on 4/29/24: FVC: 2.11 (98%); FEV1: 1.43 (99%); DLCO: 10.8 (65%)  CT Chest on 07/30/2024: - 1.4 cm right upper lobe nodule ill-defined borders. Right-sided calcified pleural plaques.  Depression screening completed on 08/06/2024.  Seen on 8/6/2024: CT chest reviewed and explained to patient, a new RUL noted on most recent scan, with unknown etiology and worrisome, I suggested further evaluated with PET/CT and refers to IR for a CT guided biopsy of RUL nodule if amenable. However, patient is hesitant for any invasive procedure given her age. We discussed at least a consultation with IR to discuss possible procedure for diagnostic purpose. Patient would like to think about and call back for her decision. I discussed to call back on 08/09/2024 for her decision.   PET/CT on 8/17/24:  - Increased uptake is seen in the RIGHT supraclavicular region and a suspected lymph node (0.7 cm, SUV 7.1, image 77) versus a thyroid lesion. Additional similar foci are seen on the LEFT (0.9 x 0.7 cm, SUV 6.0, image 79). - Increased uptake is seen at BOTH ingrid (SUV LEFT 5.2, RIGHT 1.6). - Right-sided pleural plaques are again seen and are metabolically unremarkable.  - Right UPPER lobe nodule exhibits markedly increased uptake (1.5 x 0.9 cm, SUV 8.0, image 114) period. - Atherosclerotic calcification of nonaneurysmal abdominal aorta including visceral and/or runoff branches.  Patient presents today for follow up. Overall, she reports to be feeling well. Denies any chest pain, shortness of breath, cough, or hemoptysis.

## 2024-08-20 NOTE — ASSESSMENT
[FreeTextEntry1] : Ms. DEEP HAYWOOD, 89 year old female, former smoker, w/ hx of cervical radiculitis, hypothyroid, kidney stones, OW, plantar fasciitis, small bowel AVMs and subsequent GI bleeds, IBS, UTIs, glaucoma, macular degeneration, essential tremor, basal cell carcinoma of nose. Pt was sent for CT Abd due to bladder issue and incidentally found to have lung nodules. Referred by Dr. Madrid for further evaluation and treatment.  Seen on 8/6/2024: CT chest reviewed and explained to patient, a new RUL noted on most recent scan, with unknown etiology and worrisome, I suggested further evaluated with PET/CT and refers to IR for a CT guided biopsy of RUL nodule if amenable. However, patient is hesitant for any invasive procedure given her age. We discussed at least a consultation with IR to discuss possible procedure for diagnostic purpose. Patient would like to think about and call back for her decision. I discussed to call back on 08/09/2024 for her decision.  She presents today for follow up with PET/CT.  I have reviewed the patient's medical records and diagnostic images at time of this office consultation and have made the following recommendation: 1. PET/CT reviewed with patient, FDG avid RUL nodule with uptake to right supraclavicular and hilar area. Discussed RUL nodule biopsy vs. surgical intervention for tissue diagnosis. However, once again patient adamantly refused to undergo any invasive procedure. She would rather continue surveillance, discussed repeating CT Chest without contrast in 3 months for re-evaluation. She is agreeable.    Recommendations reviewed with patient during this office visit, and all questions answered; Patient instructed on the importance of follow up and verbalizes understanding.    I, ARCELIA Rizzo, personally performed the evaluation and management (E/M) services for this established patient. That E/M includes conducting the examination, assessing all new/exacerbated conditions, and establishing a new plan of care. Today, my ACP, Hunter Aden NP, was here to observe my evaluation and management services for this new problem/exacerbated condition to be followed going forward.

## 2024-09-11 ENCOUNTER — APPOINTMENT (OUTPATIENT)
Dept: PULMONOLOGY | Facility: CLINIC | Age: 89
End: 2024-09-11

## 2024-11-12 ENCOUNTER — APPOINTMENT (OUTPATIENT)
Dept: CT IMAGING | Facility: IMAGING CENTER | Age: 89
End: 2024-11-12

## 2024-11-26 ENCOUNTER — APPOINTMENT (OUTPATIENT)
Dept: THORACIC SURGERY | Facility: CLINIC | Age: 89
End: 2024-11-26

## 2025-07-01 ENCOUNTER — EMERGENCY (EMERGENCY)
Facility: HOSPITAL | Age: 89
LOS: 1 days | End: 2025-07-01
Attending: STUDENT IN AN ORGANIZED HEALTH CARE EDUCATION/TRAINING PROGRAM
Payer: MEDICARE

## 2025-07-01 VITALS
RESPIRATION RATE: 19 BRPM | WEIGHT: 149.91 LBS | HEIGHT: 62 IN | DIASTOLIC BLOOD PRESSURE: 70 MMHG | OXYGEN SATURATION: 96 % | SYSTOLIC BLOOD PRESSURE: 194 MMHG | TEMPERATURE: 98 F | HEART RATE: 63 BPM

## 2025-07-01 DIAGNOSIS — Z98.890 OTHER SPECIFIED POSTPROCEDURAL STATES: Chronic | ICD-10-CM

## 2025-07-01 PROCEDURE — 12001 RPR S/N/AX/GEN/TRNK 2.5CM/<: CPT

## 2025-07-01 PROCEDURE — 99285 EMERGENCY DEPT VISIT HI MDM: CPT | Mod: 25

## 2025-07-02 VITALS
DIASTOLIC BLOOD PRESSURE: 85 MMHG | SYSTOLIC BLOOD PRESSURE: 158 MMHG | RESPIRATION RATE: 19 BRPM | OXYGEN SATURATION: 97 % | TEMPERATURE: 98 F | HEART RATE: 68 BPM

## 2025-07-02 LAB
ALBUMIN SERPL ELPH-MCNC: 4.1 G/DL — SIGNIFICANT CHANGE UP (ref 3.3–5)
ALP SERPL-CCNC: 83 U/L — SIGNIFICANT CHANGE UP (ref 40–120)
ALT FLD-CCNC: 21 U/L — SIGNIFICANT CHANGE UP (ref 10–45)
ANION GAP SERPL CALC-SCNC: 11 MMOL/L — SIGNIFICANT CHANGE UP (ref 5–17)
APTT BLD: 32.3 SEC — SIGNIFICANT CHANGE UP (ref 26.1–36.8)
AST SERPL-CCNC: 32 U/L — SIGNIFICANT CHANGE UP (ref 10–40)
BASOPHILS # BLD AUTO: 0.05 K/UL — SIGNIFICANT CHANGE UP (ref 0–0.2)
BASOPHILS NFR BLD AUTO: 0.6 % — SIGNIFICANT CHANGE UP (ref 0–2)
BILIRUB SERPL-MCNC: 0.5 MG/DL — SIGNIFICANT CHANGE UP (ref 0.2–1.2)
BUN SERPL-MCNC: 23 MG/DL — SIGNIFICANT CHANGE UP (ref 7–23)
CALCIUM SERPL-MCNC: 9.5 MG/DL — SIGNIFICANT CHANGE UP (ref 8.4–10.5)
CHLORIDE SERPL-SCNC: 105 MMOL/L — SIGNIFICANT CHANGE UP (ref 96–108)
CO2 SERPL-SCNC: 23 MMOL/L — SIGNIFICANT CHANGE UP (ref 22–31)
CREAT SERPL-MCNC: 0.65 MG/DL — SIGNIFICANT CHANGE UP (ref 0.5–1.3)
EGFR: 85 ML/MIN/1.73M2 — SIGNIFICANT CHANGE UP
EGFR: 85 ML/MIN/1.73M2 — SIGNIFICANT CHANGE UP
EOSINOPHIL # BLD AUTO: 0.17 K/UL — SIGNIFICANT CHANGE UP (ref 0–0.5)
EOSINOPHIL NFR BLD AUTO: 2.1 % — SIGNIFICANT CHANGE UP (ref 0–6)
GLUCOSE SERPL-MCNC: 109 MG/DL — HIGH (ref 70–99)
HCT VFR BLD CALC: 38.9 % — SIGNIFICANT CHANGE UP (ref 34.5–45)
HGB BLD-MCNC: 12.4 G/DL — SIGNIFICANT CHANGE UP (ref 11.5–15.5)
IMM GRANULOCYTES # BLD AUTO: 0.02 K/UL — SIGNIFICANT CHANGE UP (ref 0–0.07)
IMM GRANULOCYTES NFR BLD AUTO: 0.2 % — SIGNIFICANT CHANGE UP (ref 0–0.9)
INR BLD: 0.95 RATIO — SIGNIFICANT CHANGE UP (ref 0.85–1.16)
LYMPHOCYTES # BLD AUTO: 1.17 K/UL — SIGNIFICANT CHANGE UP (ref 1–3.3)
LYMPHOCYTES NFR BLD AUTO: 14.4 % — SIGNIFICANT CHANGE UP (ref 13–44)
MCHC RBC-ENTMCNC: 29.9 PG — SIGNIFICANT CHANGE UP (ref 27–34)
MCHC RBC-ENTMCNC: 31.9 G/DL — LOW (ref 32–36)
MCV RBC AUTO: 93.7 FL — SIGNIFICANT CHANGE UP (ref 80–100)
MONOCYTES # BLD AUTO: 0.65 K/UL — SIGNIFICANT CHANGE UP (ref 0–0.9)
MONOCYTES NFR BLD AUTO: 8 % — SIGNIFICANT CHANGE UP (ref 2–14)
NEUTROPHILS # BLD AUTO: 6.05 K/UL — SIGNIFICANT CHANGE UP (ref 1.8–7.4)
NEUTROPHILS NFR BLD AUTO: 74.7 % — SIGNIFICANT CHANGE UP (ref 43–77)
NRBC # BLD AUTO: 0 K/UL — SIGNIFICANT CHANGE UP (ref 0–0)
NRBC # FLD: 0 K/UL — SIGNIFICANT CHANGE UP (ref 0–0)
NRBC BLD AUTO-RTO: 0 /100 WBCS — SIGNIFICANT CHANGE UP (ref 0–0)
PLATELET # BLD AUTO: 253 K/UL — SIGNIFICANT CHANGE UP (ref 150–400)
PMV BLD: 9.7 FL — SIGNIFICANT CHANGE UP (ref 7–13)
POTASSIUM SERPL-MCNC: 4.2 MMOL/L — SIGNIFICANT CHANGE UP (ref 3.5–5.3)
POTASSIUM SERPL-SCNC: 4.2 MMOL/L — SIGNIFICANT CHANGE UP (ref 3.5–5.3)
PROT SERPL-MCNC: 6.7 G/DL — SIGNIFICANT CHANGE UP (ref 6–8.3)
PROTHROM AB SERPL-ACNC: 10.9 SEC — SIGNIFICANT CHANGE UP (ref 9.9–13.4)
RBC # BLD: 4.15 M/UL — SIGNIFICANT CHANGE UP (ref 3.8–5.2)
RBC # FLD: 13.7 % — SIGNIFICANT CHANGE UP (ref 10.3–14.5)
SODIUM SERPL-SCNC: 139 MMOL/L — SIGNIFICANT CHANGE UP (ref 135–145)
WBC # BLD: 8.11 K/UL — SIGNIFICANT CHANGE UP (ref 3.8–10.5)
WBC # FLD AUTO: 8.11 K/UL — SIGNIFICANT CHANGE UP (ref 3.8–10.5)

## 2025-07-02 PROCEDURE — 85610 PROTHROMBIN TIME: CPT

## 2025-07-02 PROCEDURE — 86850 RBC ANTIBODY SCREEN: CPT

## 2025-07-02 PROCEDURE — 70450 CT HEAD/BRAIN W/O DYE: CPT | Mod: 26

## 2025-07-02 PROCEDURE — 85025 COMPLETE CBC W/AUTO DIFF WBC: CPT

## 2025-07-02 PROCEDURE — 70450 CT HEAD/BRAIN W/O DYE: CPT | Mod: 26,77

## 2025-07-02 PROCEDURE — 70450 CT HEAD/BRAIN W/O DYE: CPT

## 2025-07-02 PROCEDURE — 80053 COMPREHEN METABOLIC PANEL: CPT

## 2025-07-02 PROCEDURE — 86901 BLOOD TYPING SEROLOGIC RH(D): CPT

## 2025-07-02 PROCEDURE — 85730 THROMBOPLASTIN TIME PARTIAL: CPT

## 2025-07-02 PROCEDURE — 86900 BLOOD TYPING SEROLOGIC ABO: CPT

## 2025-07-02 NOTE — ED PROVIDER NOTE - PATIENT PORTAL LINK FT
You can access the FollowMyHealth Patient Portal offered by [facility] by registering at the following website: [email or web address]. By joining [facility]’s FollowMyHealth portal, you will also be able to view your health information using other applications (apps) compatible with our system.

## 2025-07-02 NOTE — CONSULT NOTE ADULT - ASSESSMENT
Dalila Lopez  89F no AC/AP s/p mech trip/fall. CTH w/ small L parietal tSAH. Exam: intact   -No acute nsgy intervention. 4h rpt CTH. If read stable, ok to DC home w/ outpatient f/u w/ Dr. Danielson in 2wks  -No AC/AP. No AEDs.

## 2025-07-02 NOTE — ED PROVIDER NOTE - OBJECTIVE STATEMENT
89-year-old female past medical history glaucoma presenting to the ED status post mechanical fall.  The patient was cleaning her floor, fell backwards and landed on her back.  Patient sustained a laceration to the posterior aspect of her scalp.  Denies LOC, nausea, vomiting, neck pain, chest pain, shortness of breath, abdominal pain

## 2025-07-02 NOTE — ED PROVIDER NOTE - PROGRESS NOTE DETAILS
Lara GRAVES, PGY-2;  Patient with evidence of subarachnoid hemorrhage, consulted neurosurgery, ordered for interval CT scan, discussed results with patient bedside. Attending Nona: I received sign out on this patient. I am aware of the previously determined ongoing plan of care and what, if any, tests/consults are pending from the previous provider. I am available for supervision of the ongoing plan of care for the Resident/ANGIE/Fellow/Student. Repeat CTH stable, pt cleared by NSGY for outpatient f/u, no AEDs. pt re-assessed this morning, reports feeling well. Ambulatory without difficulty or assistance, no n/v. Spoke with patient's HCP/emergency contact, Nevin, will come to take patient home. - Oksana Singleton PA-C

## 2025-07-02 NOTE — ED PROVIDER NOTE - CARE PLAN
1 Principal Discharge DX:	Scalp laceration   Principal Discharge DX:	Scalp laceration  Secondary Diagnosis:	Head injury

## 2025-07-02 NOTE — CONSULT NOTE ADULT - SUBJECTIVE AND OBJECTIVE BOX
p (1480)     HPI: 89F no AC/AP s/p mech trip/fall. CTH w/ small L parietal tSAH. Coags wnl. Exam: intact         --Anticoagulation:    =====================  PAST MEDICAL HISTORY   Hypothyroidism    IBS (Irritable Bowel Syndrome)    AVM (arteriovenous malformation)    Renal stone    Tremors of nervous system    H/O basal cell carcinoma excision    Sinus infection    Macular degeneration of left eye    Glaucoma    COVID-19 vaccine series completed    Closed fracture of left elbow      PAST SURGICAL HISTORY   S/P appendectomy    S/P tonsillectomy    AVM (congenital arteriovenous malformation)    Prolapse of uterus    History of lithotripsy    History of open reduction and internal fixation (ORIF) procedure      No Known Allergies      MEDICATIONS:  Antibiotics:    Neuro:    Other:      SOCIAL HISTORY:   Occupation:   Marital Status:     FAMILY HISTORY:  FH: coronary artery disease (Father)        ROS: Negative except per HPI    LABS:  PT/INR - ( 02 Jul 2025 04:48 )   PT: 10.9 sec;   INR: 0.95 ratio         PTT - ( 02 Jul 2025 04:48 )  PTT:32.3 sec                        12.4   8.11  )-----------( 253      ( 02 Jul 2025 04:48 )             38.9     07-02    139  |  105  |  23  ----------------------------<  109[H]  4.2   |  23  |  0.65    Ca    9.5      02 Jul 2025 04:48    TPro  6.7  /  Alb  4.1  /  TBili  0.5  /  DBili  x   /  AST  32  /  ALT  21  /  AlkPhos  83  07-02

## 2025-07-02 NOTE — ED ADULT NURSE NOTE - OBJECTIVE STATEMENT
89 y/o F BIB self p/w c/o fall. A+ox4. Pt states earlier yesterday was swiffering and tripped and fell over the swifer causing for her to stumble backwards and hit her head on the floor. Denies LOC, was able to get self up after, not on blood thinners. Reports laceration to back L of head, bleeding controlled. Denies any dizziness/ lightheadedness pre/ post fall. Denies CP, SOB, difficulty ambulating, vision changes, headache, n/v/d, f/c. No other complaints at this time, side rails up, call bell in reach, bed lowest position, comfort and safety maintained.

## 2025-07-02 NOTE — ED ADULT NURSE NOTE - CADM POA PRESS ULCER
No Scc Desmoplastic Subtype Histology Text: There were aggregates of squamous cells in a desk-plastic pattern.

## 2025-07-02 NOTE — ED PROVIDER NOTE - ATTENDING CONTRIBUTION TO CARE
I have personally performed a face to face medical and diagnostic evaluation of the patient. I have discussed with and reviewed the Resident's note and agree with the History, ROS, Physical Exam and MDM unless otherwise indicated. A brief summary of my personal evaluation and impression can be found below.    89-year-old female no pertinent past medical history presenting for head injury after mechanical fall.  Patient states that she was using her sweater for and lost her balance while cleaning fell backwards and hit the back of her head resulting in a laceration.  No LOC.  Patient not on any anticoagulation.  Does have a history of AV malformations within the abdomen, but no known malformations within the brain.   On exam, vital signs stable, laceration noted to the posterior scalp, no active bleeding.  No other focal exam findings.  Given age, cannot rule out chance of intracranial bleed.  Plan for CT head.  Will need lack repair. patient states that she is likely up-to-date on tetanus,  will see PCP to confirm outpatient.  Reassess dispo. Grace Redman, ED Attending

## 2025-07-02 NOTE — ED PROCEDURE NOTE - ATTENDING CONTRIBUTION TO CARE
I have personally discussed the indications, risks and benefits of the above procedure with the resident. I was present for key portions of the procedure and assisted when required. Grace Redman, ED Attending

## 2025-07-02 NOTE — ED PROVIDER NOTE - PHYSICAL EXAMINATION
Physical Exam:  Gen: NAD, AOx3, non-toxic appearing, able to ambulate without assistance  Head: NCAT  HEENT: EOMI, PEERLA, normal conjunctiva, tongue midline, oral mucosa moist  Lung: CTAB, no respiratory distress, no wheezes/rhonchi/rales B/L, speaking in full sentences  CV: RRR, no murmurs, rubs or gallops  Abd: soft, NT, ND, no guarding, no rigidity, no rebound tenderness, no CVA tenderness   MSK: no visible deformities, ROM normal in UE/LE, no back pain  Neuro: No focal sensory or motor deficits  Skin: laceration to scalp 1cm, no active bleeding  Psych: normal affect, calm

## 2025-07-02 NOTE — ED PROVIDER NOTE - CARE PROVIDER_API CALL
Jose Manuel Danielson  Neurological Surgery  [address], [address]  [place] [address]  Phone: [phone]  Fax: [phone]  Follow Up Time: 1-3 Days

## 2025-07-02 NOTE — ED PROVIDER NOTE - CLINICAL SUMMARY MEDICAL DECISION MAKING FREE TEXT BOX
89-year-old female past medical history glaucoma presenting to the ED status post mechanical fall.  The patient was cleaning her floor, fell backwards and landed on her back.  Patient sustained a laceration to the posterior aspect of her scalp.  Denies LOC, nausea, vomiting, neck pain, chest pain, shortness of breath, abdominal pain. On arrival to emergency department patient is hypertensive 194/70, heart rate 63 extremity, febrile, satting 96% on room air.  On physical exam patient no acute distress, PERRL, EOMI, CN II to XII intact, no midline CTL spine tenderness. Patient ambulatory w/o assistance. No chest wall/back/abdominal ttp. Lungs clear to auscultaiton. No cardiac murmurs/rubs. No pevlic instability, full ROM of LE in hip flexion/extnesion. Differential includes but is not limited to head laceration, less likely SDH, less likely SAH, less likely cervical spine fx. Plan to order CTH and repair laceration.

## 2025-07-02 NOTE — ED PROVIDER NOTE - NSFOLLOWUPINSTRUCTIONS_ED_ALL_ED_FT
Laceration    WHAT YOU NEED TO KNOW:    A laceration is an injury to the skin and the soft tissue underneath it. Lacerations happen when you are cut or hit by something. They can happen anywhere on the body.     DISCHARGE INSTRUCTIONS:    Return to the emergency department if:     You have heavy bleeding or bleeding that does not stop after 10 minutes of holding firm, direct pressure over the wound.       Your wound opens up.     Contact your healthcare provider if:     You have a fever or chills.       Your laceration is red, warm, or swollen.      You have red streaks on your skin coming from your wound.      You have white or yellow drainage from the wound that smells bad.      You have pain that gets worse, even after treatment.       You have questions or concerns about your condition or care.     Medicines:     Prescription pain medicine may be given. Ask how to take this medicine safely.       Antibiotics help treat or prevent a bacterial infection.       Take your medicine as directed. Contact your healthcare provider if you think your medicine is not helping or if you have side effects. Tell him or her if you are allergic to any medicine. Keep a list of the medicines, vitamins, and herbs you take. Include the amounts, and when and why you take them. Bring the list or the pill bottles to follow-up visits. Carry your medicine list with you in case of an emergency.    Care for your wound as directed:     Do not get your wound wet until your healthcare provider says it is okay. Do not soak your wound in water. Do not go swimming until your healthcare provider says it is okay. Carefully wash the wound with soap and water. Gently pat the area dry or allow it to air dry.       Change your bandages when they get wet, dirty, or after washing. Apply new, clean bandages as directed. Do not apply elastic bandages or tape too tight. Do not put powders or lotions over your incision.       Apply antibiotic ointment as directed. Your healthcare provider may give you antibiotic ointment to put over your wound if you have stitches. If you have strips of tape over your incision, let them dry up and fall off on their own. If they do not fall off within 14 days, gently remove them. If you have glue over your wound, do not remove or pick at it. If your glue comes off, do not replace it with glue that you have at home.       Check your wound every day for signs of infection such as swelling, redness, or pus.     Self-care:     Apply ice on your wound for 15 to 20 minutes every hour or as directed. Use an ice pack, or put crushed ice in a plastic bag. Cover it with a towel. Ice helps prevent tissue damage and decreases swelling and pain.      Use a splint as directed. A splint will decrease movement and stress on your wound. It may help it heal faster. A splint may be used for lacerations over joints or areas of your body that bend. Ask your healthcare provider how to apply and remove a splint.       Decrease scarring of your wound by applying ointments as directed. Do not apply ointments until your healthcare provider says it is okay. You may need to wait until your wound is healed. Ask which ointment to buy and how often to use it. After your wound is healed, use sunscreen over the area when you are out in the sun. You should do this for at least 6 months to 1 year after your injury.     Follow up with your healthcare provider as directed: You may need to follow up in 24 to 48 hours to have your wound checked for infection. You will need to return in 3 to 14 days if you have stitches or staples so they can be removed. Care for your wound as directed to prevent infection and help it heal. Write down your questions so you remember to ask them during your visits. Today you were evaluated at [facility] emergency department following a fall.    While you were here we performed a CT of your head.  There is evidence of a small subarachnoid bleed.    Due to the small brain bleed we want you to follow-up with Dr. Danielson (neurosurgery)  [phone] [address], [address], [place] [address]    Additionally we placed staples due to a laceration of your scalp.  The staples will need to be removed in the next 5 to 7 days.    Please return to the ED for worst headache of your life, nausea, vomiting, fever, warmth around the wound site.    It is important to keep the wound as dry as possible. Today you were evaluated at [facility] emergency department following a fall.    While you were here we performed a CT of your head.  There is evidence of a small subarachnoid bleed.    Due to the small brain bleed we want you to follow-up with Dr. Danielson (neurosurgery)  [phone] [address], [address], [place] [address]    Additionally we placed staples due to a laceration of your scalp.  The staples will need to be removed in the next  7 days.    Please return to the ED for persistent headaches, nausea, vomiting, fever, warmth around the wound site, vision changes, weakness, or any other concerns.     It is important to keep the wound as dry as possible and keep it clean.

## 2025-07-07 ENCOUNTER — NON-APPOINTMENT (OUTPATIENT)
Age: 89
End: 2025-07-07

## 2025-07-09 ENCOUNTER — NON-APPOINTMENT (OUTPATIENT)
Age: 89
End: 2025-07-09

## 2025-07-09 NOTE — ED ADULT TRIAGE NOTE - RESPIRATORY RATE (BREATHS/MIN)
Discussed with the patient the current USPSTF guidelines released March 9, 2021 for screening for lung cancer.    For adults aged 50 to 80 years who have a 20 pack-year smoking history and currently smoke or have quit within the past 15 years the grade B recommendation is to:  Screen for lung cancer with low-dose computed tomography (LDCT) every year.  Stop screening once a person has not smoked for 15 years or has a health problem that limits life expectancy or the ability to have lung surgery.    The patient  reports that she quit smoking about 2 years ago. Her smoking use included cigarettes. She started smoking about 53 years ago. She has a 50.7 pack-year smoking history. She has been exposed to tobacco smoke. She does not have any smokeless tobacco history on file.. Discussed with patient the risks and benefits of screening, including over-diagnosis, false positive rate, and total radiation exposure.  The patient currently exhibits no signs or symptoms suggestive of lung cancer.  Discussed with patient the importance of compliance with yearly annual lung cancer screenings and willingness to undergo diagnosis and treatment if screening scan is positive.  In addition, the patient was counseled regarding the importance of remaining smoke free and/or total smoking cessation.    Also reviewed the following if the patient has Medicare that as of February 10, 2022, Medicare only covers LDCT screening in patients aged 50-77 with at least a 20 pack-year smoking history who currently smoke or have quit in the last 15 years   19